# Patient Record
Sex: MALE | Race: WHITE | NOT HISPANIC OR LATINO
[De-identification: names, ages, dates, MRNs, and addresses within clinical notes are randomized per-mention and may not be internally consistent; named-entity substitution may affect disease eponyms.]

---

## 2017-03-09 PROBLEM — Z00.00 ENCOUNTER FOR PREVENTIVE HEALTH EXAMINATION: Status: ACTIVE | Noted: 2017-03-09

## 2017-03-20 ENCOUNTER — APPOINTMENT (OUTPATIENT)
Dept: CARDIOLOGY | Facility: CLINIC | Age: 52
End: 2017-03-20

## 2017-03-23 ENCOUNTER — APPOINTMENT (OUTPATIENT)
Dept: CARDIOLOGY | Facility: CLINIC | Age: 52
End: 2017-03-23

## 2017-04-03 ENCOUNTER — NON-APPOINTMENT (OUTPATIENT)
Age: 52
End: 2017-04-03

## 2017-04-03 ENCOUNTER — APPOINTMENT (OUTPATIENT)
Dept: CARDIOLOGY | Facility: CLINIC | Age: 52
End: 2017-04-03

## 2017-04-03 VITALS
HEART RATE: 74 BPM | HEIGHT: 71 IN | WEIGHT: 204 LBS | SYSTOLIC BLOOD PRESSURE: 138 MMHG | DIASTOLIC BLOOD PRESSURE: 81 MMHG | OXYGEN SATURATION: 97 % | BODY MASS INDEX: 28.56 KG/M2

## 2017-04-03 DIAGNOSIS — R06.02 SHORTNESS OF BREATH: ICD-10-CM

## 2017-04-03 DIAGNOSIS — Z80.1 FAMILY HISTORY OF MALIGNANT NEOPLASM OF TRACHEA, BRONCHUS AND LUNG: ICD-10-CM

## 2017-05-04 ENCOUNTER — APPOINTMENT (OUTPATIENT)
Dept: CARDIOLOGY | Facility: CLINIC | Age: 52
End: 2017-05-04

## 2019-11-11 ENCOUNTER — APPOINTMENT (OUTPATIENT)
Dept: ORTHOPEDIC SURGERY | Facility: CLINIC | Age: 54
End: 2019-11-11
Payer: COMMERCIAL

## 2019-11-11 VITALS
BODY MASS INDEX: 28.56 KG/M2 | SYSTOLIC BLOOD PRESSURE: 124 MMHG | HEART RATE: 73 BPM | HEIGHT: 71 IN | WEIGHT: 204 LBS | DIASTOLIC BLOOD PRESSURE: 80 MMHG

## 2019-11-11 DIAGNOSIS — M48.02 SPINAL STENOSIS, CERVICAL REGION: ICD-10-CM

## 2019-11-11 PROCEDURE — 99204 OFFICE O/P NEW MOD 45 MIN: CPT

## 2019-11-11 RX ORDER — AZITHROMYCIN 250 MG/1
250 TABLET, FILM COATED ORAL
Qty: 6 | Refills: 0 | Status: ACTIVE | COMMUNITY
Start: 2019-10-09

## 2019-11-11 RX ORDER — METHYLPREDNISOLONE 4 MG/1
4 TABLET ORAL
Qty: 21 | Refills: 0 | Status: ACTIVE | COMMUNITY
Start: 2019-09-12

## 2019-11-11 RX ORDER — MONTELUKAST 10 MG/1
10 TABLET, FILM COATED ORAL
Qty: 14 | Refills: 0 | Status: ACTIVE | COMMUNITY
Start: 2019-10-22

## 2019-11-11 RX ORDER — METHYLPREDNISOLONE 4 MG/1
4 TABLET ORAL
Qty: 2 | Refills: 1 | Status: ACTIVE | COMMUNITY
Start: 2019-11-11 | End: 1900-01-01

## 2019-11-11 RX ORDER — AZELASTINE HYDROCHLORIDE 137 UG/1
137 SPRAY, METERED NASAL
Qty: 30 | Refills: 0 | Status: ACTIVE | COMMUNITY
Start: 2019-10-22

## 2020-01-13 ENCOUNTER — APPOINTMENT (OUTPATIENT)
Dept: ORTHOPEDIC SURGERY | Facility: CLINIC | Age: 55
End: 2020-01-13
Payer: COMMERCIAL

## 2020-01-13 VITALS — HEART RATE: 74 BPM | SYSTOLIC BLOOD PRESSURE: 134 MMHG | DIASTOLIC BLOOD PRESSURE: 86 MMHG

## 2020-01-13 DIAGNOSIS — M54.12 RADICULOPATHY, CERVICAL REGION: ICD-10-CM

## 2020-01-13 PROCEDURE — 99214 OFFICE O/P EST MOD 30 MIN: CPT

## 2020-01-22 ENCOUNTER — EMERGENCY (EMERGENCY)
Facility: HOSPITAL | Age: 55
LOS: 0 days | Discharge: HOME | End: 2020-01-22
Attending: EMERGENCY MEDICINE
Payer: COMMERCIAL

## 2020-01-22 VITALS
RESPIRATION RATE: 18 BRPM | TEMPERATURE: 99 F | DIASTOLIC BLOOD PRESSURE: 73 MMHG | OXYGEN SATURATION: 100 % | HEART RATE: 73 BPM | SYSTOLIC BLOOD PRESSURE: 129 MMHG

## 2020-01-22 VITALS — HEIGHT: 71 IN | WEIGHT: 184.97 LBS

## 2020-01-22 DIAGNOSIS — H57.11 OCULAR PAIN, RIGHT EYE: ICD-10-CM

## 2020-01-22 DIAGNOSIS — H05.011 CELLULITIS OF RIGHT ORBIT: ICD-10-CM

## 2020-01-22 DIAGNOSIS — Z88.0 ALLERGY STATUS TO PENICILLIN: ICD-10-CM

## 2020-01-22 LAB
ALBUMIN SERPL ELPH-MCNC: 4.5 G/DL — SIGNIFICANT CHANGE UP (ref 3.3–5)
ALP SERPL-CCNC: 75 U/L — SIGNIFICANT CHANGE UP (ref 40–120)
ALT FLD-CCNC: 30 U/L — SIGNIFICANT CHANGE UP (ref 12–78)
ANION GAP SERPL CALC-SCNC: 5 MMOL/L — SIGNIFICANT CHANGE UP (ref 5–17)
APTT BLD: 35.5 SEC — SIGNIFICANT CHANGE UP (ref 27.5–36.3)
AST SERPL-CCNC: 20 U/L — SIGNIFICANT CHANGE UP (ref 15–37)
BASOPHILS # BLD AUTO: 0.04 K/UL — SIGNIFICANT CHANGE UP (ref 0–0.2)
BASOPHILS NFR BLD AUTO: 0.5 % — SIGNIFICANT CHANGE UP (ref 0–2)
BILIRUB SERPL-MCNC: 1.3 MG/DL — HIGH (ref 0.2–1.2)
BUN SERPL-MCNC: 17 MG/DL — SIGNIFICANT CHANGE UP (ref 7–23)
CALCIUM SERPL-MCNC: 9.7 MG/DL — SIGNIFICANT CHANGE UP (ref 8.5–10.1)
CHLORIDE SERPL-SCNC: 104 MMOL/L — SIGNIFICANT CHANGE UP (ref 96–108)
CO2 SERPL-SCNC: 30 MMOL/L — SIGNIFICANT CHANGE UP (ref 22–31)
CREAT SERPL-MCNC: 1.25 MG/DL — SIGNIFICANT CHANGE UP (ref 0.5–1.3)
EOSINOPHIL # BLD AUTO: 0.21 K/UL — SIGNIFICANT CHANGE UP (ref 0–0.5)
EOSINOPHIL NFR BLD AUTO: 2.5 % — SIGNIFICANT CHANGE UP (ref 0–6)
GLUCOSE SERPL-MCNC: 87 MG/DL — SIGNIFICANT CHANGE UP (ref 70–99)
HCT VFR BLD CALC: 49.3 % — SIGNIFICANT CHANGE UP (ref 39–50)
HGB BLD-MCNC: 17 G/DL — SIGNIFICANT CHANGE UP (ref 13–17)
IMM GRANULOCYTES NFR BLD AUTO: 0.5 % — SIGNIFICANT CHANGE UP (ref 0–1.5)
INR BLD: 1.15 RATIO — SIGNIFICANT CHANGE UP (ref 0.88–1.16)
LYMPHOCYTES # BLD AUTO: 2.16 K/UL — SIGNIFICANT CHANGE UP (ref 1–3.3)
LYMPHOCYTES # BLD AUTO: 25.6 % — SIGNIFICANT CHANGE UP (ref 13–44)
MAGNESIUM SERPL-MCNC: 2.2 MG/DL — SIGNIFICANT CHANGE UP (ref 1.6–2.6)
MCHC RBC-ENTMCNC: 30.1 PG — SIGNIFICANT CHANGE UP (ref 27–34)
MCHC RBC-ENTMCNC: 34.5 GM/DL — SIGNIFICANT CHANGE UP (ref 32–36)
MCV RBC AUTO: 87.4 FL — SIGNIFICANT CHANGE UP (ref 80–100)
MONOCYTES # BLD AUTO: 0.69 K/UL — SIGNIFICANT CHANGE UP (ref 0–0.9)
MONOCYTES NFR BLD AUTO: 8.2 % — SIGNIFICANT CHANGE UP (ref 2–14)
NEUTROPHILS # BLD AUTO: 5.31 K/UL — SIGNIFICANT CHANGE UP (ref 1.8–7.4)
NEUTROPHILS NFR BLD AUTO: 62.7 % — SIGNIFICANT CHANGE UP (ref 43–77)
PLATELET # BLD AUTO: 365 K/UL — SIGNIFICANT CHANGE UP (ref 150–400)
POTASSIUM SERPL-MCNC: 4.1 MMOL/L — SIGNIFICANT CHANGE UP (ref 3.5–5.3)
POTASSIUM SERPL-SCNC: 4.1 MMOL/L — SIGNIFICANT CHANGE UP (ref 3.5–5.3)
PROT SERPL-MCNC: 8.5 GM/DL — HIGH (ref 6–8.3)
PROTHROM AB SERPL-ACNC: 12.8 SEC — SIGNIFICANT CHANGE UP (ref 10–12.9)
RBC # BLD: 5.64 M/UL — SIGNIFICANT CHANGE UP (ref 4.2–5.8)
RBC # FLD: 12 % — SIGNIFICANT CHANGE UP (ref 10.3–14.5)
SODIUM SERPL-SCNC: 139 MMOL/L — SIGNIFICANT CHANGE UP (ref 135–145)
WBC # BLD: 8.45 K/UL — SIGNIFICANT CHANGE UP (ref 3.8–10.5)
WBC # FLD AUTO: 8.45 K/UL — SIGNIFICANT CHANGE UP (ref 3.8–10.5)

## 2020-01-22 PROCEDURE — 85730 THROMBOPLASTIN TIME PARTIAL: CPT

## 2020-01-22 PROCEDURE — 99285 EMERGENCY DEPT VISIT HI MDM: CPT

## 2020-01-22 PROCEDURE — 85610 PROTHROMBIN TIME: CPT

## 2020-01-22 PROCEDURE — 80053 COMPREHEN METABOLIC PANEL: CPT

## 2020-01-22 PROCEDURE — 85025 COMPLETE CBC W/AUTO DIFF WBC: CPT

## 2020-01-22 PROCEDURE — 70481 CT ORBIT/EAR/FOSSA W/DYE: CPT

## 2020-01-22 PROCEDURE — 36415 COLL VENOUS BLD VENIPUNCTURE: CPT

## 2020-01-22 PROCEDURE — 83735 ASSAY OF MAGNESIUM: CPT

## 2020-01-22 PROCEDURE — 99284 EMERGENCY DEPT VISIT MOD MDM: CPT | Mod: 25

## 2020-01-22 PROCEDURE — 70481 CT ORBIT/EAR/FOSSA W/DYE: CPT | Mod: 26

## 2020-01-22 RX ORDER — SODIUM CHLORIDE 9 MG/ML
1000 INJECTION INTRAMUSCULAR; INTRAVENOUS; SUBCUTANEOUS ONCE
Refills: 0 | Status: COMPLETED | OUTPATIENT
Start: 2020-01-22 | End: 2020-01-22

## 2020-01-22 RX ADMIN — SODIUM CHLORIDE 2000 MILLILITER(S): 9 INJECTION INTRAMUSCULAR; INTRAVENOUS; SUBCUTANEOUS at 15:50

## 2020-01-22 NOTE — ED STATDOCS - EYES, MLM
Scleral injection and edema throughout with mild swelling or upper and lower lid of right eye. Left eye normal.

## 2020-01-22 NOTE — ED STATDOCS - PROGRESS NOTE DETAILS
55 y/o male with no significant PMHx presents to the ED c/o right eye pain and swelling. Pt states he was at work yesterday, went to the bathroom and noticed blood in his right eye. Pt sent to ED by Dr. Desai for evaluation to r/o right orbital cellulitis. Allergies: Penicillin. No other complaints at this time.  PE: Scleral injection and edema throughout with mild swelling or upper and lower lid of right eye. Left eye normal.  Plan: CT orbits, labs, speak with renetta Luu PA-C Dr. Roach spoke with ortho Dr. Desai, who does not seem to have privileges here, would like CT orbits, pt will need to be transferred if CT positive  Grisel Luu PA-C labs WNL, CT with Right periorbital soft tissue swelling suggesting cellulitis. No evidence of orbital cellulitis.  When speaking with pt again he let us know [facility] started him on doxy and polytrim drops which he has seen a slight improvement since taking 2 doses.  As there is no orbital cellulitis will d/c home c/w abx, f/u with optho tomorrow w/o fail, also advised warm compresses.  Strict return precautions given including fevers, worsening pain, visual changes or other new or worsening sx, pt verbalized understanding.  Spoke with optho Dr. Desai again would like her office to see pt tmrw, appt was made for pt at 9AM  .  Pt agreeable to d/c and plan of care, all questions answered  Grisel Luu PA-C

## 2020-01-22 NOTE — ED STATDOCS - PATIENT PORTAL LINK FT
You can access the FollowMyHealth Patient Portal offered by [facility] by registering at the following website: [email or web address]. By joining [facility]’s FollowMyHealth portal, you will also be able to view your health information using other applications (apps) compatible with our system.

## 2020-01-22 NOTE — ED STATDOCS - OBJECTIVE STATEMENT
53 y/o male with no significant PMHx presents to the ED c/o right eye pain and swelling. Pt states he was at work yesterday, went to the bathroom and noticed blood in his right eye. Pt sent to ED by Dr. Desai for evaluation to r/o right orbital cellulitis. Allergies: Penicillin. No other complaints at this time.

## 2020-01-22 NOTE — ED ADULT NURSE NOTE - NSIMPLEMENTINTERV_GEN_ALL_ED
Implemented All Universal Safety Interventions:  [place] to call system. Call bell, personal items and telephone within reach. Instruct patient to call for assistance. Room bathroom lighting operational. Non-slip footwear when patient is off stretcher. Physically safe environment: no spills, clutter or unnecessary equipment. Stretcher in lowest position, wheels locked, appropriate side rails in place.

## 2020-01-22 NOTE — ED STATDOCS - ATTENDING CONTRIBUTION TO CARE
I, Nicole Roach MD,  performed the initial face to face bedside interview with this patient regarding history of present illness, review of symptoms and relevant past medical, social and family history.  I completed an independent physical examination.  I was the initial provider who evaluated this patient. I have signed out the follow up of any pending tests (i.e. labs, radiological studies) to the ACP.  I have communicated the patient’s plan of care and disposition with the ACP.  The history, relevant review of systems, past medical and surgical history, medical decision making, and physical examination was documented by the scribe in my presence and I attest to the accuracy of the documentation.

## 2020-01-22 NOTE — ED ADULT NURSE NOTE - OBJECTIVE STATEMENT
Pt to ED c/o right eye pain and swelling. Pt states he was at work yesterday, went to the bathroom and noticed blood in his right eye. Pt sent to ED by Dr. Desai for evaluation to r/o right orbital cellulitis.

## 2020-01-22 NOTE — ED STATDOCS - NSFOLLOWUPINSTRUCTIONS_ED_ALL_ED_FT
Preseptal Cellulitis, Adult    Please go to your appointment with [facility] tomorrow at 9:00 AM      Preseptal cellulitis is an infection of the eyelid and the tissues around the eye (periorbital area). The infection causes painful swelling and redness. This condition may also be called periorbital cellulitis.  In most cases, the condition can be treated with antibiotic medicine at home. It is important to treat preseptal cellulitis right away so that it does not get worse. If it gets worse, it can spread to the eye socket and eye muscles (orbital cellulitis). Orbital cellulitis is a medical emergency.  What are the causes?  Preseptal cellulitis is most commonly caused by bacteria. In rare cases, it can be caused by a virus or fungus. The germs that cause preseptal cellulitis may come from:  A sinus infection that spreads near the eyes. An injury near the eye, such as a scratch, animal bite, or insect bite. A skin rash that becomes infected, such as eczema or poison ivy. An infected pimple on the eyelid (stye).Infection after eyelid surgery or injury.  What increases the risk?  You are more likely to develop this condition if:  You have a weakened disease-fighting system (immune system).You have a medical condition that raises your risk for sinus infections, such as nasal polyps.  What are the signs or symptoms?  Symptoms of this condition usually develop suddenly. Symptoms may include:  Eyelids that are red and swollen and feel unusually hot.  Fever. Difficulty opening the eye.  Headache. Facial pain.  How is this diagnosed?  This condition may be diagnosed based on your symptoms, your medical history, and an eye exam. You may have tests, such as:  Blood tests.CT scan.MRI.  How is this treated?  This condition is treated with antibiotic medicines. These may be given by mouth (orally), through an IV, or as a shot. In rare cases, you may need surgery to drain an infected area.  Follow these instructions at home:  Medicines     If you were prescribed an antibiotic to take at home, take it as told by your health care provider. Do not stop taking the antibiotic even if you start to feel better.  Take over-the-counter and prescription medicines only as told by your health care provider.  Eye Care     Do not use eye drops without first getting approval from your health care provider.Do not touch or rub your eye. If you wear contact lenses, do not wear them until your health care provider approves.Keep the eye area clean and dry.Wash the eye area with a clean washcloth, warm water, and baby shampoo or mild soap.To help relieve discomfort, place a clean washcloth that is wet with warm water over your eye. Leave the washcloth on for a few minutes, then remove it.General instructions     Wash your hands with soap and water often. If soap and water are not available, use hand .Do not use any products that contain nicotine or tobacco, such as cigarettes and e-cigarettes. If you need help quitting, ask your health care provider.Drink enough fluid to keep your urine pale yellow.Ask your health care provider if it is safe for you to drive.Stay up to date on your vaccinations.Keep all follow-up visits as told by your health care provider. This includes any visits with an eye specialist (ophthalmologist) or dentist. This is important.Get help right away if:  You have new symptoms.Your symptoms get worse or do not get better with treatment.You have a fever.Your vision becomes blurry or gets worse in any way.Your eye looks like it is sticking out or bulging out (proptosis).You have trouble moving your eyes.You have a severe headache.You have neck stiffness or severe neck pain.Summary  Preseptal cellulitis is an infection of the eyelid and the tissues around the eye.Symptoms of preseptal cellulitis usually develop suddenly and include red and swollen eyelids, fever, difficulty opening the eye, headache, and facial pain.This condition is treated with antibiotic medicines. Do not stop taking the antibiotic even if you start to feel better.This information is not intended to replace advice given to you by your health care provider. Make sure you discuss any questions you have with your health care provider.

## 2020-01-22 NOTE — ED STATDOCS - CLINICAL SUMMARY MEDICAL DECISION MAKING FREE TEXT BOX
labs, CT orbits, optho,   CT with Right periorbital soft tissue swelling suggesting cellulitis. No evidence of orbital cellulitis, labs WNL, will d/c home with optho appt leann, c/w doxy

## 2020-01-24 ENCOUNTER — INPATIENT (INPATIENT)
Facility: HOSPITAL | Age: 55
LOS: 1 days | Discharge: HOME | DRG: 603 | End: 2020-01-26
Attending: HOSPITALIST | Admitting: HOSPITALIST
Payer: COMMERCIAL

## 2020-01-24 VITALS
HEIGHT: 71 IN | TEMPERATURE: 98 F | RESPIRATION RATE: 16 BRPM | WEIGHT: 184.97 LBS | DIASTOLIC BLOOD PRESSURE: 78 MMHG | SYSTOLIC BLOOD PRESSURE: 131 MMHG | HEART RATE: 77 BPM | OXYGEN SATURATION: 97 %

## 2020-01-24 DIAGNOSIS — H05.011 CELLULITIS OF RIGHT ORBIT: ICD-10-CM

## 2020-01-24 DIAGNOSIS — Z90.49 ACQUIRED ABSENCE OF OTHER SPECIFIED PARTS OF DIGESTIVE TRACT: Chronic | ICD-10-CM

## 2020-01-24 LAB
ALBUMIN SERPL ELPH-MCNC: 5 G/DL — SIGNIFICANT CHANGE UP (ref 3.3–5)
ALP SERPL-CCNC: 67 U/L — SIGNIFICANT CHANGE UP (ref 40–120)
ALT FLD-CCNC: 21 U/L — SIGNIFICANT CHANGE UP (ref 10–45)
ANION GAP SERPL CALC-SCNC: 13 MMOL/L — SIGNIFICANT CHANGE UP (ref 5–17)
APTT BLD: 33.9 SEC — SIGNIFICANT CHANGE UP (ref 27.5–36.3)
AST SERPL-CCNC: 21 U/L — SIGNIFICANT CHANGE UP (ref 10–40)
BASOPHILS # BLD AUTO: 0.06 K/UL — SIGNIFICANT CHANGE UP (ref 0–0.2)
BASOPHILS NFR BLD AUTO: 0.9 % — SIGNIFICANT CHANGE UP (ref 0–2)
BILIRUB SERPL-MCNC: 1.1 MG/DL — SIGNIFICANT CHANGE UP (ref 0.2–1.2)
BUN SERPL-MCNC: 18 MG/DL — SIGNIFICANT CHANGE UP (ref 7–23)
CALCIUM SERPL-MCNC: 10.4 MG/DL — SIGNIFICANT CHANGE UP (ref 8.4–10.5)
CHLORIDE SERPL-SCNC: 98 MMOL/L — SIGNIFICANT CHANGE UP (ref 96–108)
CO2 SERPL-SCNC: 28 MMOL/L — SIGNIFICANT CHANGE UP (ref 22–31)
CREAT SERPL-MCNC: 1.27 MG/DL — SIGNIFICANT CHANGE UP (ref 0.5–1.3)
EOSINOPHIL # BLD AUTO: 0.2 K/UL — SIGNIFICANT CHANGE UP (ref 0–0.5)
EOSINOPHIL NFR BLD AUTO: 2.8 % — SIGNIFICANT CHANGE UP (ref 0–6)
GLUCOSE SERPL-MCNC: 90 MG/DL — SIGNIFICANT CHANGE UP (ref 70–99)
HCT VFR BLD CALC: 48.5 % — SIGNIFICANT CHANGE UP (ref 39–50)
HGB BLD-MCNC: 16.6 G/DL — SIGNIFICANT CHANGE UP (ref 13–17)
IMM GRANULOCYTES NFR BLD AUTO: 0.9 % — SIGNIFICANT CHANGE UP (ref 0–1.5)
INR BLD: 1.1 RATIO — SIGNIFICANT CHANGE UP (ref 0.88–1.16)
LYMPHOCYTES # BLD AUTO: 2.15 K/UL — SIGNIFICANT CHANGE UP (ref 1–3.3)
LYMPHOCYTES # BLD AUTO: 30.5 % — SIGNIFICANT CHANGE UP (ref 13–44)
MCHC RBC-ENTMCNC: 29.7 PG — SIGNIFICANT CHANGE UP (ref 27–34)
MCHC RBC-ENTMCNC: 34.2 GM/DL — SIGNIFICANT CHANGE UP (ref 32–36)
MCV RBC AUTO: 86.9 FL — SIGNIFICANT CHANGE UP (ref 80–100)
MONOCYTES # BLD AUTO: 0.71 K/UL — SIGNIFICANT CHANGE UP (ref 0–0.9)
MONOCYTES NFR BLD AUTO: 10.1 % — SIGNIFICANT CHANGE UP (ref 2–14)
NEUTROPHILS # BLD AUTO: 3.86 K/UL — SIGNIFICANT CHANGE UP (ref 1.8–7.4)
NEUTROPHILS NFR BLD AUTO: 54.8 % — SIGNIFICANT CHANGE UP (ref 43–77)
NRBC # BLD: 0 /100 WBCS — SIGNIFICANT CHANGE UP (ref 0–0)
PLATELET # BLD AUTO: 353 K/UL — SIGNIFICANT CHANGE UP (ref 150–400)
POTASSIUM SERPL-MCNC: 4 MMOL/L — SIGNIFICANT CHANGE UP (ref 3.5–5.3)
POTASSIUM SERPL-SCNC: 4 MMOL/L — SIGNIFICANT CHANGE UP (ref 3.5–5.3)
PROT SERPL-MCNC: 8.1 G/DL — SIGNIFICANT CHANGE UP (ref 6–8.3)
PROTHROM AB SERPL-ACNC: 12.7 SEC — SIGNIFICANT CHANGE UP (ref 10–12.9)
RBC # BLD: 5.58 M/UL — SIGNIFICANT CHANGE UP (ref 4.2–5.8)
RBC # FLD: 11.8 % — SIGNIFICANT CHANGE UP (ref 10.3–14.5)
SODIUM SERPL-SCNC: 139 MMOL/L — SIGNIFICANT CHANGE UP (ref 135–145)
WBC # BLD: 7.04 K/UL — SIGNIFICANT CHANGE UP (ref 3.8–10.5)
WBC # FLD AUTO: 7.04 K/UL — SIGNIFICANT CHANGE UP (ref 3.8–10.5)

## 2020-01-24 PROCEDURE — 99285 EMERGENCY DEPT VISIT HI MDM: CPT

## 2020-01-24 PROCEDURE — 70481 CT ORBIT/EAR/FOSSA W/DYE: CPT | Mod: 26

## 2020-01-24 PROCEDURE — 99223 1ST HOSP IP/OBS HIGH 75: CPT

## 2020-01-24 RX ORDER — SODIUM CHLORIDE 9 MG/ML
1000 INJECTION INTRAMUSCULAR; INTRAVENOUS; SUBCUTANEOUS ONCE
Refills: 0 | Status: COMPLETED | OUTPATIENT
Start: 2020-01-24 | End: 2020-01-24

## 2020-01-24 RX ORDER — VANCOMYCIN HCL 1 G
1000 VIAL (EA) INTRAVENOUS ONCE
Refills: 0 | Status: COMPLETED | OUTPATIENT
Start: 2020-01-24 | End: 2020-01-24

## 2020-01-24 RX ORDER — CEFTRIAXONE 500 MG/1
1000 INJECTION, POWDER, FOR SOLUTION INTRAMUSCULAR; INTRAVENOUS EVERY 24 HOURS
Refills: 0 | Status: DISCONTINUED | OUTPATIENT
Start: 2020-01-24 | End: 2020-01-24

## 2020-01-24 RX ORDER — CIPROFLOXACIN LACTATE 400MG/40ML
400 VIAL (ML) INTRAVENOUS ONCE
Refills: 0 | Status: DISCONTINUED | OUTPATIENT
Start: 2020-01-24 | End: 2020-01-24

## 2020-01-24 RX ADMIN — SODIUM CHLORIDE 1000 MILLILITER(S): 9 INJECTION INTRAMUSCULAR; INTRAVENOUS; SUBCUTANEOUS at 20:54

## 2020-01-24 RX ADMIN — Medication 250 MILLIGRAM(S): at 20:55

## 2020-01-24 NOTE — H&P ADULT - PROBLEM SELECTOR PLAN 3
Transitions of Care Status:  1.  Name of PCP: Larissa Green  2.  PCP Contacted on Admission: [ ] Y    [ x] N    3.  PCP contacted at Discharge: [ ] Y    [ ] N    [ ] N/A  4.  Post-Discharge Appointment Date and Location:  5.  Summary of Handoff given to PCP:

## 2020-01-24 NOTE — ED PROVIDER NOTE - EYES [+], MLM
R eye swelling, R eye pain exacerbated with ocular movement, bleeding from R eye, R facial swelling R eye swelling, R eye pain exacerbated with ocular movement, bleeding from R eye, R facial swelling, mild blurry R eye vision/VISUAL CHANGES

## 2020-01-24 NOTE — H&P ADULT - NSHPLABSRESULTS_GEN_ALL_CORE
Labs personally reviewed : cbc, coags, cmp all unrevealing.   Imaging personally reviewed Repeat CT orbits showed +R periorbital soft tissue swelling suggesting cellulitis possibly a bit worsened but without evidence of orbital cellulitis. +mucosal thickening of b/l frontal/ethmoid/maxillary sinuses.  NO ekg in chart.

## 2020-01-24 NOTE — H&P ADULT - HISTORY OF PRESENT ILLNESS
54 M no known PMH, p/w progressive R eye pain.    VS: 98.2, 77, 131/78, 16, 97% RA.  Labs: cbc, coags, cmp all unrevealing. Repeat CT orbits showed +R periorbital soft tissue swelling suggesting cellulitis without evidence of orbital cellulitis. +mucosal thickening of b/l frontal/ethmoid/maxillary sinuses. In ER pt received IV vanco/ceftriaxone and IVF prior to medicine team involvement. 54 M hx of EBV infection, p/w progressive R eye pain. Sx started 1/21/20 while at work.  Noticed redness like blood in eye. +pain, worsened with movement, +eye swelling and tearing. Pt doesn't recall foreign body or bite. Never had previous episode like this.  Pt notes that about 1-2 wks ago felt a flare of his EBV with bronchitis like sx, thinks current eye sx may be related.  Also notes +sick contacts (wife/children all sick with flu like illness); pt denies other URI sx or myalgias. Denies cp, sob, f/c, +mild intermittent nausea no v/d, denies dysuria. Pt was seen at urgent care center 1/21/20, started on oral doxycycline. Sx not really changing, so he was referred to [place] ER 1/22/20 for CT scan of orbits that showed periorbital cellulitis without evidence of orbital cellulitis.  Was sent home, and then followed with Dr. Brendan Gagnon (oculoplastic surgery) today and was sent in for evaluation of orbital cellulitis as his exam appears to have worsened despite oral antibiotics.     VS: 98.2, 77, 131/78, 16, 97% RA.  Labs: cbc, coags, cmp all unrevealing. Repeat CT orbits showed +R periorbital soft tissue swelling suggesting cellulitis without evidence of orbital cellulitis. +mucosal thickening of b/l frontal/ethmoid/maxillary sinuses. In ER pt received IV vanco/ceftriaxone and IVF prior to medicine team involvement. 54 M hx of EBV infection, p/w progressive R eye pain. Sx started 1/21/20 while at work.  Noticed redness like blood in eye. +pain, worsened with movement, +eye swelling and tearing. Pt doesn't recall foreign body or bite. Never had previous episode like this.  Pt notes that about 1-2 wks ago felt a flare of his EBV with bronchitis like sx, thinks current eye sx may be related.  Also notes +sick contacts (wife/children all sick with flu like illness); pt denies other URI sx or myalgias. Denies cp, sob, f/c, +mild intermittent nausea no v/d, denies dysuria. Pt was seen at urgent care center 1/21/20, started on oral doxycycline. Sx not really changing, so he was referred to [place] ER 1/22/20 for CT scan of orbits that showed periorbital cellulitis without evidence of orbital cellulitis.  Was sent home, and then followed with Dr. Brendan Gagnon (oculoplastic surgery) today and was sent in for evaluation of orbital cellulitis as his exam appears to have worsened despite oral antibiotics.     VS: 98.2, 77, 131/78, 16, 97% RA.  Labs: cbc, coags, cmp all unrevealing. Repeat CT orbits showed +R periorbital soft tissue swelling suggesting cellulitis possibly a bit worsened but without evidence of orbital cellulitis. +mucosal thickening of b/l frontal/ethmoid/maxillary sinuses. In ER pt received IV vanco  and IVF prior to medicine team involvement.

## 2020-01-24 NOTE — H&P ADULT - PROBLEM SELECTOR PLAN 1
-erythema, chemosis, tearing, pain of R eye, unclear precipitant. Interval repeat CT orbits confirms slightly worsened preseptal cellulitis WITHOUT evidence of orbital cellulitis  -optho eval appreciated, c/w IV abx, erythromycin ointment over R eyelid margin QHS.   -Pt offered IV vanco and IV ceftriaxone.  Pt with noted PCN allergy as child (reported as possible hives/rash); I explained at length the overall relatively low risk of cross reactivity with cephalosporins especially with no documented anaphylaxis to PCN in past.  Patient still hesitant, wants to avoid cephalosporins unless absolutely necessary.   -Will start IV vancomycin, IV levaquin, and add IV flagyl for anaerobic coverage given concurrent sinusitis and suspected oral/sinus translocation as possible source. D/w ID fellow overnight, in agreement, pt to be seen by their service in am.   -consider addition of acyclovir for hx of EBV though efficacy of acyclovir for latent, non-actively replicating EBV infections is not supported. Defer to ID for addition of antivirals to treatment regimen.   -f/u bcx  -serial eye exams, optho f/u

## 2020-01-24 NOTE — ED ADULT NURSE NOTE - OBJECTIVE STATEMENT
55y/o M coming to the ED c/o of R eye swelling and pain.  Pt states that tuesday he was at work when he began to have an uncomfortable itching in his eye and when he looked in the mirror he noticed a pool of blood in his eye. Pt initally saw an opthamologist who then sent him to [place] ED for further evaluation where the CT showed right periobital cellulitis. Pt then followed up with opthalmologist yesterday and was sent home with ABX. Pt then saw surgeon today who then sent him here for further evaluation for progression d/t the surrounding area of his eye is beginning to swell. Pt denies any trauma to his eye. Pt states denies any CP/Fever/Chills/N/V/D. Pt states that he has been congested over the past two weeks but denies any cough. On exam, his L eye appears swollen & red w/o discharge, swelling and redness noted to the R upper cheek underneath the eye. Pt denies any change in vision. PERRL. IV placed. Labs collected and sent.

## 2020-01-24 NOTE — CONSULT NOTE ADULT - SUBJECTIVE AND OBJECTIVE BOX
[facility] Ophthalmology Consult Note    HPI:  The pt is a 53 yo M who presents, sent in by private ophthalmologist Dr Brendan Gagnon of [facility] ([phone]), for concern of orbital cellulitis of the right eye. The pt noticed some redness of the bottom of the bulbar conjunctiva of the right eye on Tuesday. The pt visited a [facility] MD, who examined the pt and was given a prescription of PO Doxycycline 100 mg BID. The pt felt persistent pain but felt some improvement of the redness and chemosis of the right eye through Wednesday, and sought further care. He was examined by Optometrist Dr Sheela Desai of [facility] on Wednesday who advised the pt to continue Oral antibiotics and to go to [facility] for a CT That same day. The pt did so, and was seen in the ED and a CT Orbits was performed which demonstrated periorbital soft tissue cellulitis, no evidence of orbital cellulitis. The pt was recommended continuation of PO ABX and was discharged from the ED. On Thursday, the pt was seen by Ophthalmologist Dr Prado of [facility], who recommended continuation of oral ABX, and recommended daily follow up. Dr Gagnon examined the pt today who reported  and was concerned for worsening  chemosis of the right eye, and was concerned orbital cellulitis OD and sent  the pt to the ED.     The pt denies fever, chills,   The pt does has report malaise, pain with EOM, tearing of the right eye, pt has yellow/white discharge from the right eye, with crusting of the right eyelid upon waking.     PMH: appendectomy   Meds: None  POcHx (including surgeries/lasers/trauma):  Radial keratotomy OU (1998)   Drops: None  FamHx: None  Social Hx: None  Allergies: Penicillin     ROS:  General (neg), Vision (per HPI), Head and Neck (as above), Pulm (recent nasal congestion), CV (neg), GI (neg),  (neg), Musculoskeletal (neg), Skin/Integ (neg), Neuro (neg), Endocrine (neg), Heme (neg), All/Immuno (neg)    Mood and Affect Appropriate ( x ),  Oriented to Time, Place, and Person x 3 ( x )    Ophthalmology Exam    Visual acuity (sc): OD: 20/40 pinhole improves to 20/20-2 , OS: 20/30 pinhole improves to 20/20-2   Pupils: PERRL OU, no APD  Ttono: 18 OD, 16 OS  Extraocular movements (EOMs): Full OU, no pain, no diplopia  Confrontational Visual Field (CVF):  Full OU  Color Plates: 12/12 OU    Slit Lamp Exam (SLE)  External:  OD: tender to palpation over the periorbit, upper and lower eyelids, and right maxilla, which appears edematous Flat OS, no resistance to retropulsion OU   Lids/Lashes/Lacrimal Ducts: Flat OU    Sclera/Conjunctiva:  OD: +3 chemosis of the bulbar conjunctiva, +2 injection diffuse of the conjunctiva, W+Q OS. OD: due to chemosis of the bulbar conjunctiva, the lid cannot completely close, leaving protruding shelf of bulbar conjunctiva,   Cornea: Cl OU, has circumferential keratotomy incisions OU   Anterior Chamber: D+Q OU   Iris:  Flat OU  Lens:  Cl OU    Fundus Exam: dilated with 1% tropicamide and 2.5% phenylephrine  Approval obtained from primary team for dilation  Patient aware that pupils can remained dilated for at least 4-6 hours  Exam performed with 20D lens    Vitreous: wnl OU  Disc, cup/disc: sharp and pink, 0.3 OU  Macula:  wnl OU  Vessels:  wnl OU  Periphery: wnl OU      Assessment and Plan:   53 yo M dx with preseptal cellulitis this week, presents with progression of right eye injection and chemosis on PO Doxycycline, now with pain with EOM OD, sent in by private ophthalmologist Dr Brendan Gagnon of [facility] ([phone]), for concern of orbital cellulitis of the right eye.     - may have been the result of the spread of a recent sinus infection, given recent hx of cough/nasal congestion   - given progression, recommend CT Orbits with and without contrast if possible,   - CBC with diff, and blood Cx   - given exacerbation of inflammation on Oral antibiotics, reasonable to admit pt for broad spectrum IV antibiotics as per primary team, IV Ceftriaxone and Vancomycin is a reasonable regimen  - recommend generous Erythromycin ointment QHS over the right eyelid margin and over the protruding injected conjunctiva to prevent breakdown overnight.   - will follow closely [facility] Ophthalmology Consult Note    HPI:  The pt is a 53 yo M who presents, sent in by private ophthalmologist Dr Brendan Gagnon of [facility] ([phone]), for concern of orbital cellulitis of the right eye. The pt noticed some redness of the bottom of the bulbar conjunctiva of the right eye on Tuesday. The pt visited a [facility] MD, who examined the pt and was given a prescription of PO Doxycycline 100 mg BID. The pt felt persistent pain but felt some improvement of the redness and chemosis of the right eye through Wednesday, and sought further care. He was examined by Optometrist Dr Sheela Desai of [facility] on Wednesday who advised the pt to continue Oral antibiotics and to go to [facility] for a CT That same day. The pt did so, and was seen in the ED and a CT Orbits was performed which demonstrated periorbital soft tissue cellulitis, no evidence of orbital cellulitis. The pt was recommended continuation of PO ABX and was discharged from the ED. On Thursday, the pt was seen by Ophthalmologist Dr Prado of [facility], who recommended continuation of oral ABX, and recommended daily follow up. Dr Gagnon examined the pt today who reported  and was concerned for worsening  chemosis of the right eye, and was concerned orbital cellulitis OD and sent  the pt to the ED.     The pt denies fever, chills,   The pt does has report malaise, pain with EOM, tearing of the right eye, pt has yellow/white discharge from the right eye, with crusting of the right eyelid upon waking.     PMH: appendectomy   Meds: None  POcHx (including surgeries/lasers/trauma):  Radial keratotomy OU (1998)   Drops: None  FamHx: None  Social Hx: None  Allergies: Penicillin     ROS:  General (neg), Vision (per HPI), Head and Neck (as above), Pulm (recent nasal congestion), CV (neg), GI (neg),  (neg), Musculoskeletal (neg), Skin/Integ (neg), Neuro (neg), Endocrine (neg), Heme (neg), All/Immuno (neg)    Mood and Affect Appropriate ( x ),  Oriented to Time, Place, and Person x 3 ( x )    Ophthalmology Exam    Visual acuity (sc): OD: 20/40 pinhole improves to 20/20-2 , OS: 20/30 pinhole improves to 20/20-2   Pupils: PERRL OU, no APD  Ttono: 18 OD, 16 OS  Extraocular movements (EOMs): Full OU, no pain, no diplopia  Confrontational Visual Field (CVF):  Full OU  Color Plates: 12/12 OU    Slit Lamp Exam (SLE)  External:  OD: tender to palpation over the periorbit, upper and lower eyelids, and right maxilla, which appears edematous Flat OS, no resistance to retropulsion OU   Lids/Lashes/Lacrimal Ducts: Flat OU    Sclera/Conjunctiva:  OD: +3 chemosis of the bulbar conjunctiva, +2 injection diffuse of the conjunctiva, W+Q OS. OD: due to chemosis of the bulbar conjunctiva, the lid cannot completely close, leaving protruding shelf of bulbar conjunctiva,   Cornea: Cl OU, has circumferential keratotomy incisions OU   Anterior Chamber: D+Q OU   Iris:  Flat OU  Lens:  Cl OU    Fundus Exam: dilated with 1% tropicamide and 2.5% phenylephrine  Approval obtained from primary team for dilation  Patient aware that pupils can remained dilated for at least 4-6 hours  Exam performed with 20D lens    Vitreous: wnl OU  Disc, cup/disc: sharp and pink, 0.3 OU  Macula:  wnl OU  Vessels:  wnl OU  Periphery: wnl OU      Assessment and Plan:   53 yo M dx with preseptal cellulitis this week, presents with progression of right eye injection and chemosis on PO Doxycycline, now with pain with EOM OD, sent in by private ophthalmologist Dr Brendan Gagnon of [facility] ([phone]), for concern of orbital cellulitis of the right eye.     - may have been the result of the spread of a recent sinus infection, given recent hx of cough/nasal congestion. The chemosis and injection of the bulbar conjunctiva with less pronounced edema of the lids may be an atypical preseptal/orbital process,   - given progression, recommend CT Orbits with and without contrast if possible, if imaging negative, then this raises the possibility of a bulbar process such as episcleritis vs severe conjunctivitis  - CBC with diff, and blood Cx   - given exacerbation of inflammation on Oral antibiotics, reasonable to admit pt for broad spectrum IV antibiotics as per primary team, IV Ceftriaxone and Vancomycin is a reasonable regimen  - recommend generous Erythromycin ointment QHS over the right eyelid margin and over the protruding injected conjunctiva to prevent breakdown overnight.   - will follow closely

## 2020-01-24 NOTE — ED PROVIDER NOTE - PROGRESS NOTE DETAILS
DH: Ophtho at bedside. Will take patient to exam room for further evaluation. DH: Opho recommending repeat CT orbit w/ IV contrast, start patient on Vanco and Rocephin. Will change Rocephin to Cipro since patient PCN allergic. DH: D/w hospitalist for admission, all questions answered. Patient admitted to medicine. CT scan results still pending. Discussed w/ patient and all questions answered. Patient wife and children at bedside.

## 2020-01-24 NOTE — ED PROVIDER NOTE - PHYSICAL EXAMINATION
Gen: WNWD NAD  HEENT: NCAT, PERRL, R eye with conjunctival injection and swelling, decreased medial and lateral eye movements, superior and inferior eye movements intact.  Neck: supple  CV: RRR, no murmur  Lung: CTA BL  Neuro: CN grossly intact, sensation intact, motor 5/5 throughout GENERAL: A&Ox3, non-toxic appearing, no acute distress  HEENT: NCAT, oral mucosa moist, R conjunctival injection and swelling w/ decreased medial and lateral eye movements, R eye tearing w/o purulent discharge, superior and inferior eye movements intact, mild erythema and swelling beneath R eye, no proptosis, no ptosis  RESP: CTAB, no respiratory distress, no wheezes/rhonchi/rales, speaking in full sentences  CV: RRR, no murmurs/rubs/gallops  ABDOMEN: soft, non-tender, non-distended, no guarding  MSK: no visible deformities  NEURO: no focal sensory or motor deficits, BLACK, steady gait, PERRL  SKIN: warm, normal color, well perfused, no rash  PSYCH: normal affect    -Jesse Courtney, PGY-1

## 2020-01-24 NOTE — ED PROVIDER NOTE - OBJECTIVE STATEMENT
54y M with PMHx of EBV p/w R eye swelling with pain exacerbated with ocular movement, bleeding from R eye, and R facial swelling since Tuesday, 1/21/2020. Sx were sudden onset and started when pt was at work.  Pt initially went to [facility], was referred to [facility] and saw Dr. Sheela Desai, who ordered a CT scan that confirmed R periorbital cellulitis. Pt had a visual acuity test done, told it was WNL. He was told to return to [facility]. Pt F/U with Ophthalmologist on 1/23, who prescribed Doxycycline eyedrops which pt initially used with improvement of sx. He saw oculoplastic surgeon, Dr. Brendan Gagnon, today who advised that pt visit to ER due to his worsening condition upon examination. Denies F/C, sore throat, nasal congestion, facial pressure, or visual changes. Reports allergy to PCN. Denies alcohol or tobacco use. 54y M with PMHx of EBV p/w R eye swelling with pain exacerbated with ocular movement, bleeding from R eye, and R facial swelling since Tuesday, 1/21/2020. Sx were sudden onset and started when pt was at work.  Pt initially went to [facility], was referred to [facility] and saw Dr. Sheela Desai, who ordered a CT scan that confirmed R periorbital cellulitis. Pt had a visual acuity test done, told it was WNL. He was told to return to [facility]. Pt F/U with Ophthalmologist on 1/23, who prescribed Doxycycline eyedrops. He saw oculoplastic surgeon, Dr. Brendan Gagnon, today who advised that pt visit to ER due to his worsening condition upon examination. Denies F/C, sore throat, nasal congestion, facial pressure, or visual changes. Reports allergy to PCN. Denies alcohol or tobacco use. 54y M with PMHx of EBV p/w R eye swelling with pain exacerbated with ocular movement, bleeding from R eye, and R facial swelling since Tuesday, 1/21/2020. Sx were sudden onset and started when pt was at work.  Pt initially went to [facility], was referred to [facility] w/ CT scan that confirmed R periorbital cellulitis. Pt had a visual acuity test done, told it was WNL. He was told to return to [facility]. Pt F/U with Ophthalmologist on 1/23, who prescribed Doxycycline and Polytrip eyedrops. He saw oculoplastic surgeon, Dr. Brendan Gagnon, today who advised that pt visit to ER due to his worsening condition upon examination. Endorses recent URI 1-2 weeks ago. Currently denies F/C, sore throat, nasal congestion, facial pressure, or visual changes. Reports allergy to PCN. Denies alcohol or tobacco use.

## 2020-01-24 NOTE — ED PROVIDER NOTE - ATTENDING CONTRIBUTION TO CARE
Dr Mcneal Note: 55 yo M  p/w 3 day hx worsening R eye redness, swelling, and pain. Seen by [facility] on 1/21 and sent to [facility] w/ CT showing periorbital cellulitis, started on Doxy and Polytrim, [facility] to ED for IV abx .  Gen: no acute distress non toxic alert and coherent, no cyanosis   HEENT: atraumatic, ++significant conjunctiva injection, right lower eye lid edema,   EOMI- pain with EOM    Neck: no midline tenderness, supple  Lungs: Air entry good, clear to auscultation and percussion   CVS: reg HR S1/S2 no murmur no gallop   Neuro: AA and Ox3, CNII-XII grossly intact   A/P--   Labs, IVF, IV abx CT orbits w contrast, optho, admission

## 2020-01-24 NOTE — ED PROVIDER NOTE - CLINICAL SUMMARY MEDICAL DECISION MAKING FREE TEXT BOX
Jesse Courtney, PGY1: 54 year old male p/w 3 day hx worsening R eye redness, swelling, and pain. Seen by [facility] on 1/21 and sent to [facility] w/ CT showing periorbital cellulitis, started on Doxy and Polytrip. Patient seen again at [facility] today and referred for pain and swelling worsening, now including R inferior eye, concern for orbital cellulitis. Patient w/ decreased and painful medial and lateral eye movements, erythema and tearing of R eye, inferior eye edema and erythema, mildly blurry R eye vision, PERRL. Plan for ophtho cs, labs, abx, IVF, CT orbit w/ IV contrast, likely admission.

## 2020-01-24 NOTE — H&P ADULT - NSHPSOCIALHISTORY_GEN_ALL_CORE
Social History:    Marital Status:  (  x )    (   ) Single    (   )    (  )   Occupation:   Lives with: (  ) alone  (  ) children   (  x) spouse   (  ) parents  (  ) other    Substance Use (street drugs): (  x) never used  (  ) other:  Tobacco Usage:  (  x ) never smoked   (   ) former smoker   (   ) current smoker  (     ) pack year  (        ) last cigarette date  Alcohol Usage: denies

## 2020-01-24 NOTE — H&P ADULT - REASON FOR ADMISSION
orbital cellulitis refractory to oral antibiotics preseptal cellulitis refractory to oral antibiotics

## 2020-01-24 NOTE — ED ADULT NURSE NOTE - NSIMPLEMENTINTERV_GEN_ALL_ED
Implemented All Universal Safety Interventions:  [place] to call system. Call bell, personal items and telephone within reach. Instruct patient to call for assistance. Room bathroom lighting operational. Non-slip footwear when patient is off stretcher. Physically safe environment: no spills, clutter or unnecessary equipment. Stretcher in lowest position, wheels locked, appropriate side rails in place.

## 2020-01-24 NOTE — H&P ADULT - NSHPREVIEWOFSYSTEMS_GEN_ALL_CORE
REVIEW OF SYSTEMS:  CONSTITUTIONAL: No weakness. No fevers. No chills. No weight loss. Good appetite.  EYES: No visual changes. No eye pain.  ENT: No hearing difficulty. No vertigo. No dysphagia. No Sinusitis/rhinorrhea.  NECK: No pain. No stiffness/rigidity.  CARDIAC: No chest pain. No palpitations.  RESPIRATORY: No cough. No SOB. No hemoptysis.  GASTROINTESTINAL: No abdominal pain. No nausea. No vomiting. No hematemesis. No diarrhea. No constipation. No melena. No hematochezia.  GENITOURINARY: No dysuria. No frequency. No hesitancy. No hematuria.  NEUROLOGICAL: No numbness. No focal weakness. No incontinence. No headache.  BACK: No back pain.  EXTREMITIES: No lower extremity edema. Full ROM.  SKIN: No rashes. No itching. No other lesions.  PSYCHIATRIC: No depression. No anxiety. No SI/HI.  ALLERGIC: No lip swelling. No hives.  All other review of systems is negative unless indicated above.  [  ] Unable to assess ROS because REVIEW OF SYSTEMS:  CONSTITUTIONAL: No weakness. No fevers. No chills. No weight loss. Good appetite.  EYES: +blurriness at times in R eye, otherwise no visual changes. + R eye pain.  ENT: No hearing difficulty. No vertigo. No dysphagia. +Sinusitis recently.  NECK: No pain. No stiffness/rigidity.  CARDIAC: No chest pain. No palpitations.  RESPIRATORY: No cough. No SOB. No hemoptysis.  GASTROINTESTINAL: No abdominal pain. No nausea. No vomiting. No hematemesis. No diarrhea. No constipation. No melena. No hematochezia.  GENITOURINARY: No dysuria. No frequency. No hesitancy. No hematuria.  NEUROLOGICAL: No numbness. No focal weakness. No incontinence. No headache.  BACK: No back pain.  EXTREMITIES: No lower extremity edema. Full ROM.  SKIN: No rashes. No itching. No other lesions.  PSYCHIATRIC: No depression. No anxiety. No SI/HI.  ALLERGIC: No lip swelling. No hives.  All other review of systems is negative unless indicated above.

## 2020-01-25 DIAGNOSIS — Z02.9 ENCOUNTER FOR ADMINISTRATIVE EXAMINATIONS, UNSPECIFIED: ICD-10-CM

## 2020-01-25 DIAGNOSIS — L03.213 PERIORBITAL CELLULITIS: ICD-10-CM

## 2020-01-25 DIAGNOSIS — Z29.9 ENCOUNTER FOR PROPHYLACTIC MEASURES, UNSPECIFIED: ICD-10-CM

## 2020-01-25 LAB
ALBUMIN SERPL ELPH-MCNC: 4.2 G/DL — SIGNIFICANT CHANGE UP (ref 3.3–5)
ALP SERPL-CCNC: 57 U/L — SIGNIFICANT CHANGE UP (ref 40–120)
ALT FLD-CCNC: 18 U/L — SIGNIFICANT CHANGE UP (ref 10–45)
ANION GAP SERPL CALC-SCNC: 12 MMOL/L — SIGNIFICANT CHANGE UP (ref 5–17)
AST SERPL-CCNC: 19 U/L — SIGNIFICANT CHANGE UP (ref 10–40)
BASOPHILS # BLD AUTO: 0.03 K/UL — SIGNIFICANT CHANGE UP (ref 0–0.2)
BASOPHILS NFR BLD AUTO: 0.6 % — SIGNIFICANT CHANGE UP (ref 0–2)
BILIRUB SERPL-MCNC: 1.2 MG/DL — SIGNIFICANT CHANGE UP (ref 0.2–1.2)
BUN SERPL-MCNC: 17 MG/DL — SIGNIFICANT CHANGE UP (ref 7–23)
CALCIUM SERPL-MCNC: 9.6 MG/DL — SIGNIFICANT CHANGE UP (ref 8.4–10.5)
CHLORIDE SERPL-SCNC: 99 MMOL/L — SIGNIFICANT CHANGE UP (ref 96–108)
CO2 SERPL-SCNC: 25 MMOL/L — SIGNIFICANT CHANGE UP (ref 22–31)
CREAT SERPL-MCNC: 1.22 MG/DL — SIGNIFICANT CHANGE UP (ref 0.5–1.3)
EOSINOPHIL # BLD AUTO: 0.22 K/UL — SIGNIFICANT CHANGE UP (ref 0–0.5)
EOSINOPHIL NFR BLD AUTO: 4.2 % — SIGNIFICANT CHANGE UP (ref 0–6)
GLUCOSE SERPL-MCNC: 95 MG/DL — SIGNIFICANT CHANGE UP (ref 70–99)
HCT VFR BLD CALC: 47 % — SIGNIFICANT CHANGE UP (ref 39–50)
HGB BLD-MCNC: 15.7 G/DL — SIGNIFICANT CHANGE UP (ref 13–17)
IMM GRANULOCYTES NFR BLD AUTO: 0.8 % — SIGNIFICANT CHANGE UP (ref 0–1.5)
LYMPHOCYTES # BLD AUTO: 1.56 K/UL — SIGNIFICANT CHANGE UP (ref 1–3.3)
LYMPHOCYTES # BLD AUTO: 29.7 % — SIGNIFICANT CHANGE UP (ref 13–44)
MAGNESIUM SERPL-MCNC: 2 MG/DL — SIGNIFICANT CHANGE UP (ref 1.6–2.6)
MCHC RBC-ENTMCNC: 29.4 PG — SIGNIFICANT CHANGE UP (ref 27–34)
MCHC RBC-ENTMCNC: 33.4 GM/DL — SIGNIFICANT CHANGE UP (ref 32–36)
MCV RBC AUTO: 88 FL — SIGNIFICANT CHANGE UP (ref 80–100)
MONOCYTES # BLD AUTO: 0.7 K/UL — SIGNIFICANT CHANGE UP (ref 0–0.9)
MONOCYTES NFR BLD AUTO: 13.3 % — SIGNIFICANT CHANGE UP (ref 2–14)
NEUTROPHILS # BLD AUTO: 2.71 K/UL — SIGNIFICANT CHANGE UP (ref 1.8–7.4)
NEUTROPHILS NFR BLD AUTO: 51.4 % — SIGNIFICANT CHANGE UP (ref 43–77)
NRBC # BLD: 0 /100 WBCS — SIGNIFICANT CHANGE UP (ref 0–0)
PHOSPHATE SERPL-MCNC: 3.4 MG/DL — SIGNIFICANT CHANGE UP (ref 2.5–4.5)
PLATELET # BLD AUTO: 313 K/UL — SIGNIFICANT CHANGE UP (ref 150–400)
POTASSIUM SERPL-MCNC: 4.1 MMOL/L — SIGNIFICANT CHANGE UP (ref 3.5–5.3)
POTASSIUM SERPL-SCNC: 4.1 MMOL/L — SIGNIFICANT CHANGE UP (ref 3.5–5.3)
PROT SERPL-MCNC: 6.9 G/DL — SIGNIFICANT CHANGE UP (ref 6–8.3)
RBC # BLD: 5.34 M/UL — SIGNIFICANT CHANGE UP (ref 4.2–5.8)
RBC # FLD: 11.9 % — SIGNIFICANT CHANGE UP (ref 10.3–14.5)
SODIUM SERPL-SCNC: 136 MMOL/L — SIGNIFICANT CHANGE UP (ref 135–145)
WBC # BLD: 5.26 K/UL — SIGNIFICANT CHANGE UP (ref 3.8–10.5)
WBC # FLD AUTO: 5.26 K/UL — SIGNIFICANT CHANGE UP (ref 3.8–10.5)

## 2020-01-25 PROCEDURE — 99233 SBSQ HOSP IP/OBS HIGH 50: CPT

## 2020-01-25 PROCEDURE — 99223 1ST HOSP IP/OBS HIGH 75: CPT

## 2020-01-25 RX ORDER — CEFTRIAXONE 500 MG/1
INJECTION, POWDER, FOR SOLUTION INTRAMUSCULAR; INTRAVENOUS
Refills: 0 | Status: COMPLETED | OUTPATIENT
Start: 2020-01-25 | End: 2020-01-27

## 2020-01-25 RX ORDER — PREDNISOLONE SODIUM PHOSPHATE 1 %
1 DROPS OPHTHALMIC (EYE)
Refills: 0 | Status: DISCONTINUED | OUTPATIENT
Start: 2020-01-25 | End: 2020-01-26

## 2020-01-25 RX ORDER — METRONIDAZOLE 500 MG
500 TABLET ORAL ONCE
Refills: 0 | Status: COMPLETED | OUTPATIENT
Start: 2020-01-25 | End: 2020-01-25

## 2020-01-25 RX ORDER — CEFTRIAXONE 500 MG/1
1000 INJECTION, POWDER, FOR SOLUTION INTRAMUSCULAR; INTRAVENOUS EVERY 24 HOURS
Refills: 0 | Status: COMPLETED | OUTPATIENT
Start: 2020-01-26 | End: 2020-01-26

## 2020-01-25 RX ORDER — ERYTHROMYCIN BASE 5 MG/GRAM
1 OINTMENT (GRAM) OPHTHALMIC (EYE) AT BEDTIME
Refills: 0 | Status: DISCONTINUED | OUTPATIENT
Start: 2020-01-25 | End: 2020-01-26

## 2020-01-25 RX ORDER — METRONIDAZOLE 500 MG
500 TABLET ORAL EVERY 8 HOURS
Refills: 0 | Status: DISCONTINUED | OUTPATIENT
Start: 2020-01-25 | End: 2020-01-25

## 2020-01-25 RX ORDER — CEFTRIAXONE 500 MG/1
1000 INJECTION, POWDER, FOR SOLUTION INTRAMUSCULAR; INTRAVENOUS ONCE
Refills: 0 | Status: COMPLETED | OUTPATIENT
Start: 2020-01-25 | End: 2020-01-25

## 2020-01-25 RX ORDER — ACETAMINOPHEN 500 MG
650 TABLET ORAL EVERY 6 HOURS
Refills: 0 | Status: DISCONTINUED | OUTPATIENT
Start: 2020-01-25 | End: 2020-01-26

## 2020-01-25 RX ORDER — VANCOMYCIN HCL 1 G
1000 VIAL (EA) INTRAVENOUS EVERY 12 HOURS
Refills: 0 | Status: DISCONTINUED | OUTPATIENT
Start: 2020-01-25 | End: 2020-01-26

## 2020-01-25 RX ORDER — METRONIDAZOLE 500 MG
TABLET ORAL
Refills: 0 | Status: DISCONTINUED | OUTPATIENT
Start: 2020-01-25 | End: 2020-01-25

## 2020-01-25 RX ADMIN — Medication 100 MILLIGRAM(S): at 10:44

## 2020-01-25 RX ADMIN — Medication 250 MILLIGRAM(S): at 17:50

## 2020-01-25 RX ADMIN — Medication 1 DROP(S): at 19:41

## 2020-01-25 RX ADMIN — CEFTRIAXONE 100 MILLIGRAM(S): 500 INJECTION, POWDER, FOR SOLUTION INTRAMUSCULAR; INTRAVENOUS at 14:35

## 2020-01-25 RX ADMIN — Medication 1 DROP(S): at 23:35

## 2020-01-25 RX ADMIN — Medication 200 MILLIGRAM(S): at 20:27

## 2020-01-25 RX ADMIN — Medication 1 APPLICATION(S): at 21:14

## 2020-01-25 RX ADMIN — Medication 100 MILLIGRAM(S): at 00:19

## 2020-01-25 RX ADMIN — Medication 250 MILLIGRAM(S): at 06:02

## 2020-01-25 NOTE — CONSULT NOTE ADULT - ASSESSMENT
54 M hx of EBV infection, p/w progressive R eye pain 2/2 R preseptal cellulitis.    - c/w vancomycin  - d/c levaquin and flagyl   - pt willing to try cephalosporin- start ceftriaxone 1g IV q24h (monitor for allergic reaction)  - ophthalmology following  - follow up BCx    d/w primary team 54 M hx of EBV infection, p/w progressive R eye pain 2/2 R preseptal cellulitis.     - c/w vancomycin  - d/c levaquin and flagyl   - pt willing to try cephalosporin- start ceftriaxone 1g IV q24h (monitor for allergic reaction)  - ophthalmology following  - follow up BCx    d/w primary team

## 2020-01-25 NOTE — PROGRESS NOTE ADULT - SUBJECTIVE AND OBJECTIVE BOX
Contact Information:  Wade Barillas II, MD, MPH  PGY-1, Internal Medicine  Pager: [phone] ([facility]) /// [phone] ([facility])    TEENA NAJERA, MRN-[id number]    Patient is a 54y old  Male who presents with a chief complaint of preseptal cellulitis refractory to oral antibiotics (24 Jan 2020 22:48)      OVERNIGHT EVENTS:    SUBJECTIVE:    CONSTITUTIONAL: No weakness. No fatigue. No fever.  HEAD: No head trauma.   EYES: No vision changes.  ENT: No hearing changes or tinnitus. No ear pain. No changes in smell. No nasal congestion or discharge. No sore throat. No voice hoarseness.   NECK: No neck pain or stiffness. No lumps.  RESPIRATORY: No cough. No SOB. No wheezing. No hemoptysis.   CARDIOVASCULAR: No chest pain. No palpitations.   GASTROINTESTINAL: No dysphagia. No ABD pain. No distension. No constipation. No diarrhea. No pain with defecation. No hematemesis. No hematochezia or melena.  BACK: No back pain.  GENITOURINARY: No dysuria. No frequency or urgency. No hesitancy. No incontinence. No urinary retention. No suprapubic pain. No hematuria.  EXTREMITY: No swelling.  MUSCULOSKELETAL: No joint pain or swelling. No fractures. No stiffness.    SKIN: No rashes. No itching. No skin, hair, or nail changes.  NEUROLOGICAL: No weakness or paralysis. No lightheadedness or dizziness. No HA. No numbness or tingling.   PSYCHIATRIC: No depression.       OBJECTIVE:  Vital Signs Last 24 Hrs  T(C): 36.7 (25 Jan 2020 05:19), Max: 36.8 (24 Jan 2020 17:00)  T(F): 98.1 (25 Jan 2020 05:19), Max: 98.2 (24 Jan 2020 17:00)  HR: 72 (25 Jan 2020 05:19) (72 - 77)  BP: 114/75 (25 Jan 2020 05:19) (114/75 - 140/82)  BP(mean): --  RR: 18 (25 Jan 2020 05:19) (16 - 18)  SpO2: 96% (25 Jan 2020 05:19) (96% - 97%)  I&O's Summary      MEDICATIONS  (STANDING):  erythromycin   Ointment 1 Application(s) Right EYE at bedtime  levoFLOXacin IVPB      metroNIDAZOLE  IVPB      metroNIDAZOLE  IVPB 500 milliGRAM(s) IV Intermittent every 8 hours  vancomycin  IVPB 1000 milliGRAM(s) IV Intermittent every 12 hours    MEDICATIONS  (PRN):  acetaminophen   Tablet .. 650 milliGRAM(s) Oral every 6 hours PRN Temp greater or equal to 38C (100.4F), Mild Pain (1 - 3), Moderate Pain (4 - 6), Severe Pain (7 - 10)    Allergies    penicillin (Rash)    Intolerances        CONSTITUTIONAL: No acute distress. Awake and alert.  HEAD: No evidence of trauma. Structures WNL.  EYES: +PERRL. +EOMI. No scleral icterus. No conjunctival injection.  ENT: Moist oral mucosa. No erythema. No pharyngeal exudates.   NECK: Supple. Appropriate ROM. No stiffness. No masses or lymphadenopathy.  RESPIRATORY: CTAB. No wheezes, rales, or rhonchi. No accessory muscle use. No apparent respiratory distress.  CARDIOVASCULAR: +S1/S2. No audible S3/S4. Regular rate and rhythm. No murmurs, rubs, or gallops. 2+ radial pulses x b/l UE; 2+ DP pulses x b/l LE.   GASTROINTESTINAL: Soft, nontender, nondistended. +BS. No rebound or guarding.   BACK: No spinal or paraspinal tenderness. No CVA tenderness.  EXTREMITY: No LE swelling or edema. EXTs warm to touch.  MUSCULOSKELETAL: Movement evident in all limbs. No tenderness on palpation.  DERMATOLOGICAL: No abnormal rashes or lesions.  NEUROLOGICAL: CN 2-12 grossly intact. No focal deficits. Sensation intact x 4EXT. A&Ox3 (oriented to person, place, and time).  PSYCHIATRIC: Appropriate affect.                            16.6   7.04  )-----------( 353      ( 24 Jan 2020 20:48 )             48.5     PT/INR - ( 24 Jan 2020 20:48 )   PT: 12.7 sec;   INR: 1.10 ratio         PTT - ( 24 Jan 2020 20:48 )  PTT:33.9 sec  01-24    139  |  98  |  18  ----------------------------<  90  4.0   |  28  |  1.27    Ca    10.4      24 Jan 2020 20:48    TPro  8.1  /  Alb  5.0  /  TBili  1.1  /  DBili  x   /  AST  21  /  ALT  21  /  AlkPhos  67  01-24    CAPILLARY BLOOD GLUCOSE        LIVER FUNCTIONS - ( 24 Jan 2020 20:48 )  Alb: 5.0 g/dL / Pro: 8.1 g/dL / ALK PHOS: 67 U/L / ALT: 21 U/L / AST: 21 U/L / GGT: x                       RADIOLOGY AND ADDITIONAL TESTS:    CONSULTANT NOTES REVIEWED:    CARE DISCUSSED WITH THE FOLLOWING CONSULTANTS/PROVIDERS: Contact Information:  Wade Barillas II, MD, MPH  PGY-1, Internal Medicine  Pager: [phone] ([facility]) /// [phone] ([facility])    TEENA NAJERA, MRN-[id number]    Patient is a 54y old  Male who presents with a chief complaint of preseptal cellulitis refractory to oral antibiotics (24 Jan 2020 22:48)      OVERNIGHT EVENTS: Admitted due to R eye pain, radiological evidence of periorbital cellulitis not improved on Abx.    SUBJECTIVE:    CONSTITUTIONAL: No weakness. No fatigue. No fever.  HEAD: No head trauma.   EYES: No vision changes.  ENT: No hearing changes or tinnitus. No ear pain. No changes in smell. No nasal congestion or discharge. No sore throat. No voice hoarseness.   NECK: No neck pain or stiffness. No lumps.  RESPIRATORY: No cough. No SOB. No wheezing. No hemoptysis.   CARDIOVASCULAR: No chest pain. No palpitations.   GASTROINTESTINAL: No dysphagia. No ABD pain. No distension. No constipation. No diarrhea. No pain with defecation. No hematemesis. No hematochezia or melena.  BACK: No back pain.  GENITOURINARY: No dysuria. No frequency or urgency. No hesitancy. No incontinence. No urinary retention. No suprapubic pain. No hematuria.  EXTREMITY: No swelling.  MUSCULOSKELETAL: No joint pain or swelling. No fractures. No stiffness.    SKIN: No rashes. No itching. No skin, hair, or nail changes.  NEUROLOGICAL: No weakness or paralysis. No lightheadedness or dizziness. No HA. No numbness or tingling.   PSYCHIATRIC: No depression.       OBJECTIVE:  Vital Signs Last 24 Hrs  T(C): 36.7 (25 Jan 2020 05:19), Max: 36.8 (24 Jan 2020 17:00)  T(F): 98.1 (25 Jan 2020 05:19), Max: 98.2 (24 Jan 2020 17:00)  HR: 72 (25 Jan 2020 05:19) (72 - 77)  BP: 114/75 (25 Jan 2020 05:19) (114/75 - 140/82)  BP(mean): --  RR: 18 (25 Jan 2020 05:19) (16 - 18)  SpO2: 96% (25 Jan 2020 05:19) (96% - 97%)  I&O's Summary      MEDICATIONS  (STANDING):  erythromycin   Ointment 1 Application(s) Right EYE at bedtime  levoFLOXacin IVPB      metroNIDAZOLE  IVPB      metroNIDAZOLE  IVPB 500 milliGRAM(s) IV Intermittent every 8 hours  vancomycin  IVPB 1000 milliGRAM(s) IV Intermittent every 12 hours    MEDICATIONS  (PRN):  acetaminophen   Tablet .. 650 milliGRAM(s) Oral every 6 hours PRN Temp greater or equal to 38C (100.4F), Mild Pain (1 - 3), Moderate Pain (4 - 6), Severe Pain (7 - 10)    Allergies    penicillin (Rash)    Intolerances        CONSTITUTIONAL: No acute distress. Awake and alert.  HEAD: No evidence of trauma. Structures WNL.  EYES: +PERRL. +EOMI. No scleral icterus. No conjunctival injection.  ENT: Moist oral mucosa. No erythema. No pharyngeal exudates.   NECK: Supple. Appropriate ROM. No stiffness. No masses or lymphadenopathy.  RESPIRATORY: CTAB. No wheezes, rales, or rhonchi. No accessory muscle use. No apparent respiratory distress.  CARDIOVASCULAR: +S1/S2. No audible S3/S4. Regular rate and rhythm. No murmurs, rubs, or gallops. 2+ radial pulses x b/l UE; 2+ DP pulses x b/l LE.   GASTROINTESTINAL: Soft, nontender, nondistended. +BS. No rebound or guarding.   BACK: No spinal or paraspinal tenderness. No CVA tenderness.  EXTREMITY: No LE swelling or edema. EXTs warm to touch.  MUSCULOSKELETAL: Movement evident in all limbs. No tenderness on palpation.  DERMATOLOGICAL: No abnormal rashes or lesions.  NEUROLOGICAL: CN 2-12 grossly intact. No focal deficits. Sensation intact x 4EXT. A&Ox3 (oriented to person, place, and time).  PSYCHIATRIC: Appropriate affect.                            16.6   7.04  )-----------( 353      ( 24 Jan 2020 20:48 )             48.5     PT/INR - ( 24 Jan 2020 20:48 )   PT: 12.7 sec;   INR: 1.10 ratio         PTT - ( 24 Jan 2020 20:48 )  PTT:33.9 sec  01-24    139  |  98  |  18  ----------------------------<  90  4.0   |  28  |  1.27    Ca    10.4      24 Jan 2020 20:48    TPro  8.1  /  Alb  5.0  /  TBili  1.1  /  DBili  x   /  AST  21  /  ALT  21  /  AlkPhos  67  01-24    CAPILLARY BLOOD GLUCOSE        LIVER FUNCTIONS - ( 24 Jan 2020 20:48 )  Alb: 5.0 g/dL / Pro: 8.1 g/dL / ALK PHOS: 67 U/L / ALT: 21 U/L / AST: 21 U/L / GGT: x                       RADIOLOGY AND ADDITIONAL TESTS:    CONSULTANT NOTES REVIEWED:    CARE DISCUSSED WITH THE FOLLOWING CONSULTANTS/PROVIDERS: Contact Information:  Wade Barillas II, MD, MPH  PGY-1, Internal Medicine  Pager: [phone] ([facility]) /// [phone] ([facility])    TEENA NAJERA, MRN-[id number]    Patient is a 54y old  Male who presents with a chief complaint of preseptal cellulitis refractory to oral antibiotics (24 Jan 2020 22:48)      OVERNIGHT EVENTS: Admitted due to R eye pain, radiological evidence of periorbital cellulitis not improved on Abx.    SUBJECTIVE: Patient evaluated at bedside, c/o R eye redness, slight pain, and blurry vision due tearing, also a sensation of eye dryness; also endorses slight HA which he attributes to being tired. Denies fever, N/V, lightheadedness, dizziness, cough.    EYES: +R eye redness, slight pain, blurry vision 2/2 tearing  NEUROLOGICAL: +Slight HA      OBJECTIVE:  Vital Signs Last 24 Hrs  T(C): 36.7 (25 Jan 2020 05:19), Max: 36.8 (24 Jan 2020 17:00)  T(F): 98.1 (25 Jan 2020 05:19), Max: 98.2 (24 Jan 2020 17:00)  HR: 72 (25 Jan 2020 05:19) (72 - 77)  BP: 114/75 (25 Jan 2020 05:19) (114/75 - 140/82)  BP(mean): --  RR: 18 (25 Jan 2020 05:19) (16 - 18)  SpO2: 96% (25 Jan 2020 05:19) (96% - 97%)  I&O's Summary      MEDICATIONS  (STANDING):  erythromycin   Ointment 1 Application(s) Right EYE at bedtime  levoFLOXacin IVPB      metroNIDAZOLE  IVPB      metroNIDAZOLE  IVPB 500 milliGRAM(s) IV Intermittent every 8 hours  vancomycin  IVPB 1000 milliGRAM(s) IV Intermittent every 12 hours    MEDICATIONS  (PRN):  acetaminophen   Tablet .. 650 milliGRAM(s) Oral every 6 hours PRN Temp greater or equal to 38C (100.4F), Mild Pain (1 - 3), Moderate Pain (4 - 6), Severe Pain (7 - 10)    Allergies    penicillin (Rash)    Intolerances        CONSTITUTIONAL: Sitting upright in bed. No acute distress.  EYES: +PERRL. +EOMI. No scleral icterus. No conjunctival injection.  ENT: Moist oral mucosa. No erythema. No pharyngeal exudates.   NECK: Supple. Appropriate ROM. No stiffness. No masses or lymphadenopathy.  RESPIRATORY: CTAB. No wheezes, rales, or rhonchi. No accessory muscle use. No apparent respiratory distress.  CARDIOVASCULAR: +S1/S2. No audible S3/S4. Regular rate and rhythm. No murmurs, rubs, or gallops. 2+ radial pulses x b/l UE; 2+ DP pulses x b/l LE.   GASTROINTESTINAL: Soft, nontender, nondistended. +BS. No rebound or guarding.   BACK: No spinal or paraspinal tenderness. No CVA tenderness.  EXTREMITY: No LE swelling or edema. EXTs warm to touch.  MUSCULOSKELETAL: Movement evident in all limbs. No tenderness on palpation.  DERMATOLOGICAL: No abnormal rashes or lesions.  NEUROLOGICAL: CN 2-12 grossly intact. No focal deficits. Sensation intact x 4EXT. A&Ox3 (oriented to person, place, and time).  PSYCHIATRIC: Appropriate affect.                            16.6   7.04  )-----------( 353      ( 24 Jan 2020 20:48 )             48.5     PT/INR - ( 24 Jan 2020 20:48 )   PT: 12.7 sec;   INR: 1.10 ratio         PTT - ( 24 Jan 2020 20:48 )  PTT:33.9 sec  01-24    139  |  98  |  18  ----------------------------<  90  4.0   |  28  |  1.27    Ca    10.4      24 Jan 2020 20:48    TPro  8.1  /  Alb  5.0  /  TBili  1.1  /  DBili  x   /  AST  21  /  ALT  21  /  AlkPhos  67  01-24    CAPILLARY BLOOD GLUCOSE        LIVER FUNCTIONS - ( 24 Jan 2020 20:48 )  Alb: 5.0 g/dL / Pro: 8.1 g/dL / ALK PHOS: 67 U/L / ALT: 21 U/L / AST: 21 U/L / GGT: x                       RADIOLOGY AND ADDITIONAL TESTS:    CONSULTANT NOTES REVIEWED:    CARE DISCUSSED WITH THE FOLLOWING CONSULTANTS/PROVIDERS: Contact Information:  Wade Barillas II, MD, MPH  PGY-1, Internal Medicine  Pager: [phone] ([facility]) /// [phone] ([facility])    TEENA NAJERA, MRN-[id number]    Patient is a 54y old  Male who presents with a chief complaint of preseptal cellulitis refractory to oral antibiotics (24 Jan 2020 22:48)      OVERNIGHT EVENTS: Admitted due to R eye pain, radiological evidence of periorbital cellulitis not improved on Abx.    SUBJECTIVE: Patient evaluated at bedside, c/o R eye redness, slight pain, and blurry vision due tearing, also a sensation of eye dryness; also endorses slight HA which he attributes to being tired. Denies fever, N/V, lightheadedness, dizziness, cough.    EYES: +R eye redness, slight pain, blurry vision 2/2 tearing  NEUROLOGICAL: +Slight HA      OBJECTIVE:  Vital Signs Last 24 Hrs  T(C): 36.7 (25 Jan 2020 05:19), Max: 36.8 (24 Jan 2020 17:00)  T(F): 98.1 (25 Jan 2020 05:19), Max: 98.2 (24 Jan 2020 17:00)  HR: 72 (25 Jan 2020 05:19) (72 - 77)  BP: 114/75 (25 Jan 2020 05:19) (114/75 - 140/82)  BP(mean): --  RR: 18 (25 Jan 2020 05:19) (16 - 18)  SpO2: 96% (25 Jan 2020 05:19) (96% - 97%)  I&O's Summary      MEDICATIONS  (STANDING):  erythromycin   Ointment 1 Application(s) Right EYE at bedtime  levoFLOXacin IVPB      metroNIDAZOLE  IVPB      metroNIDAZOLE  IVPB 500 milliGRAM(s) IV Intermittent every 8 hours  vancomycin  IVPB 1000 milliGRAM(s) IV Intermittent every 12 hours    MEDICATIONS  (PRN):  acetaminophen   Tablet .. 650 milliGRAM(s) Oral every 6 hours PRN Temp greater or equal to 38C (100.4F), Mild Pain (1 - 3), Moderate Pain (4 - 6), Severe Pain (7 - 10)    Allergies    penicillin (Rash)    Intolerances        CONSTITUTIONAL: Sitting upright in bed. No acute distress.  HEAD: No sinus or maxillary tenderness.  EYES: +R eye erythema within the sclera with visible lacrimation. R lower eyelid swelling. Tenderness to palpation of R lower and upper eyelids. +EOMI with very slight discomfort in the R eye. +PERRL.  ENT: Moist oral mucosa. No pharyngeal exudates.   RESPIRATORY: CTAB. No wheezes, rales, or rhonchi. No accessory muscle use. No apparent respiratory distress.  CARDIOVASCULAR: +S1/S2. Regular rate and rhythm. No murmurs, rubs, or gallops.   GASTROINTESTINAL: Soft, nontender, nondistended. +BS.   EXTREMITY: No LE swelling or edema. EXTs warm to touch.  MUSCULOSKELETAL: Spontaneous movement in all limbs. No tenderness on palpation.  NEUROLOGICAL: CN 2-12 grossly intact. No focal deficits. A&Ox3 (oriented to person, place, and time).                          16.6   7.04  )-----------( 353      ( 24 Jan 2020 20:48 )             48.5     PT/INR - ( 24 Jan 2020 20:48 )   PT: 12.7 sec;   INR: 1.10 ratio         PTT - ( 24 Jan 2020 20:48 )  PTT:33.9 sec  01-24    139  |  98  |  18  ----------------------------<  90  4.0   |  28  |  1.27    Ca    10.4      24 Jan 2020 20:48    TPro  8.1  /  Alb  5.0  /  TBili  1.1  /  DBili  x   /  AST  21  /  ALT  21  /  AlkPhos  67  01-24    CAPILLARY BLOOD GLUCOSE        LIVER FUNCTIONS - ( 24 Jan 2020 20:48 )  Alb: 5.0 g/dL / Pro: 8.1 g/dL / ALK PHOS: 67 U/L / ALT: 21 U/L / AST: 21 U/L / GGT: x                       RADIOLOGY AND ADDITIONAL TESTS:    CONSULTANT NOTES REVIEWED:    CARE DISCUSSED WITH THE FOLLOWING CONSULTANTS/PROVIDERS: Contact Information:  Wade Barillas II, MD, MPH  PGY-1, Internal Medicine  Pager: [phone] ([facility]) /// [phone] ([facility])    TEENA NAJERA, MRN-[id number]    Patient is a 54y old  Male who presents with a chief complaint of preseptal cellulitis refractory to oral antibiotics (24 Jan 2020 22:48)      OVERNIGHT EVENTS: Admitted due to R eye pain, radiological evidence of periorbital cellulitis not improved on Abx.    SUBJECTIVE: Patient evaluated at bedside, c/o R eye redness, slight pain, and blurry vision due tearing, also a sensation of eye dryness; also endorses slight HA which he attributes to being tired. Denies fever, N/V, lightheadedness, dizziness, cough.    EYES: +R eye redness, slight pain, blurry vision 2/2 tearing  NEUROLOGICAL: +Slight HA      OBJECTIVE:  Vital Signs Last 24 Hrs  T(C): 36.7 (25 Jan 2020 05:19), Max: 36.8 (24 Jan 2020 17:00)  T(F): 98.1 (25 Jan 2020 05:19), Max: 98.2 (24 Jan 2020 17:00)  HR: 72 (25 Jan 2020 05:19) (72 - 77)  BP: 114/75 (25 Jan 2020 05:19) (114/75 - 140/82)  BP(mean): --  RR: 18 (25 Jan 2020 05:19) (16 - 18)  SpO2: 96% (25 Jan 2020 05:19) (96% - 97%)  I&O's Summary      MEDICATIONS  (STANDING):  erythromycin   Ointment 1 Application(s) Right EYE at bedtime  levoFLOXacin IVPB      metroNIDAZOLE  IVPB      metroNIDAZOLE  IVPB 500 milliGRAM(s) IV Intermittent every 8 hours  vancomycin  IVPB 1000 milliGRAM(s) IV Intermittent every 12 hours    MEDICATIONS  (PRN):  acetaminophen   Tablet .. 650 milliGRAM(s) Oral every 6 hours PRN Temp greater or equal to 38C (100.4F), Mild Pain (1 - 3), Moderate Pain (4 - 6), Severe Pain (7 - 10)    Allergies    penicillin (Rash)    Intolerances        CONSTITUTIONAL: Sitting upright in bed. No acute distress.  HEAD: No frontal or maxillary sinus tenderness.  EYES: +R eye erythema within the sclera with visible lacrimation. R lower eyelid swelling. Tenderness to palpation of R lower and upper eyelids. +EOMI with very slight discomfort in the R eye. +PERRL.  ENT: Moist oral mucosa. No pharyngeal exudates. No nasal congestion.  RESPIRATORY: CTAB. No wheezes, rales, or rhonchi. No accessory muscle use. No apparent respiratory distress.  CARDIOVASCULAR: +S1/S2. Regular rate and rhythm. No murmurs, rubs, or gallops.   GASTROINTESTINAL: Soft, nontender, nondistended. +BS.   EXTREMITY: No LE swelling or edema. EXTs warm to touch.  MUSCULOSKELETAL: Spontaneous movement in all limbs.   NEUROLOGICAL: CN 2-12 grossly intact. No focal deficits. A&Ox3.                          16.6   7.04  )-----------( 353      ( 24 Jan 2020 20:48 )             48.5     PT/INR - ( 24 Jan 2020 20:48 )   PT: 12.7 sec;   INR: 1.10 ratio         PTT - ( 24 Jan 2020 20:48 )  PTT:33.9 sec  01-24    139  |  98  |  18  ----------------------------<  90  4.0   |  28  |  1.27    Ca    10.4      24 Jan 2020 20:48    TPro  8.1  /  Alb  5.0  /  TBili  1.1  /  DBili  x   /  AST  21  /  ALT  21  /  AlkPhos  67  01-24    CAPILLARY BLOOD GLUCOSE        LIVER FUNCTIONS - ( 24 Jan 2020 20:48 )  Alb: 5.0 g/dL / Pro: 8.1 g/dL / ALK PHOS: 67 U/L / ALT: 21 U/L / AST: 21 U/L / GGT: x                       RADIOLOGY AND ADDITIONAL TESTS:    CONSULTANT NOTES REVIEWED:    CARE DISCUSSED WITH THE FOLLOWING CONSULTANTS/PROVIDERS:

## 2020-01-25 NOTE — PATIENT PROFILE ADULT - NSPROGENANESREACTION_GEN_A_NUR
SUBJECTIVE: Dick Anderson is a 74 y.o. male who returns to the office with chief complaint of painful fungal toenails. Patient relates toe nails are thickened/difficult to trim as well as painful with ambulation and with shoe gear. Review of Systems   Constitutional: Negative for activity change, appetite change, chills, diaphoresis, fatigue and fever. Respiratory: Negative for shortness of breath. Cardiovascular: Negative for leg swelling. Gastrointestinal: Negative for diarrhea and nausea. Endocrine: Negative for cold intolerance, heat intolerance and polyuria. Musculoskeletal: Positive for arthralgias. Negative for back pain, gait problem, joint swelling and myalgias. Skin: Negative for color change, pallor, rash and wound. Allergic/Immunologic: Negative for environmental allergies and food allergies. Neurological: Negative for dizziness, weakness, light-headedness and numbness. Hematological: Does not bruise/bleed easily. Psychiatric/Behavioral: Negative for behavioral problems, confusion and self-injury. The patient is not nervous/anxious. OBJECTIVE: Clinical evaluation of patient reveals nails 1,2,3,4,5 of the right foot and nails 1,2,3,4,5, of the left foot to present with thickness, elongation, discoloration, brittleness, and subungual debris. There was pain with palpation and debridement of the toenails of the bilateral feet. No open lesions noted to either foot today. There are preulcerative lesions noted to the right foot submetatarsal head two and three. There is pain with direct palpation of these callouses with direct pressure. Debridement of these callouses with a fifteen blade does not reveal any central core or pinpoint bleeding. There are no signs of bacterial infection noted to these callouses. There is no pain with palpation to the posterior aspect of the left calcaneus. There is no pain with palpation to the left achilles tendon.  There is no soft tissue swelling to event. Return in about 9 weeks (around 11/26/2018) for At risk diabetic foot care.    9/24/2018      Eula Martínez DPM no previous reaction

## 2020-01-25 NOTE — PROGRESS NOTE ADULT - PROBLEM SELECTOR PLAN 2
-overall low vte risk, improve VTE 0. encourae ambulation. - DVT: IMPROVE score 0 - no pharmacological prophylaxis  - Dispo: Pending, likely home  - Diet: Regular

## 2020-01-25 NOTE — PROGRESS NOTE ADULT - PROBLEM SELECTOR PLAN 1
-erythema, chemosis, tearing, pain of R eye, unclear precipitant. Interval repeat CT orbits confirms slightly worsened preseptal cellulitis WITHOUT evidence of orbital cellulitis  -optho eval appreciated, c/w IV abx, erythromycin ointment over R eyelid margin QHS.   -Pt offered IV vanco and IV ceftriaxone.  Pt with noted PCN allergy as child (reported as possible hives/rash); I explained at length the overall relatively low risk of cross reactivity with cephalosporins especially with no documented anaphylaxis to PCN in past.  Patient still hesitant, wants to avoid cephalosporins unless absolutely necessary.   -Will start IV vancomycin, IV levaquin, and add IV flagyl for anaerobic coverage given concurrent sinusitis and suspected oral/sinus translocation as possible source. D/w ID fellow overnight, in agreement, pt to be seen by their service in am.   -consider addition of acyclovir for hx of EBV though efficacy of acyclovir for latent, non-actively replicating EBV infections is not supported. Defer to ID for addition of antivirals to treatment regimen.   -f/u bcx  -serial eye exams, optho f/u - R eye redness, swelling, tearing, lacrimation, etiology unclear.  - Interval repeat CT orbits confirms slightly worsened preseptal cellulitis WITHOUT evidence of orbital cellulitis  - Optho eval appreciated, c/w IV abx, erythromycin ointment over R eyelid margin QHS.   - Patient notes PCN allergy as a child (hives/rash); made aware of minimal risk of cross-reactivity w/cephalosporins, still hesitant. C/w IV vancomycin, IV levaquin, IV flagyl for anaerobic coverage given concurrent sinusitis and suspected oral/sinus translocation as possible source.   - Formal ID consult pending   - Given PMHx of chronic EBV, will consider antiviral coverage, will discuss with ID   - F/u Bcx  - Serial eye exams, optho f/u - R eye redness, swelling, tearing, lacrimation, etiology unclear.  - Interval repeat CT orbits confirms slightly worsened preseptal cellulitis WITHOUT evidence of orbital cellulitis  - Optho eval appreciated, c/w IV abx, erythromycin ointment over R eyelid margin QHS.   - Patient notes PCN allergy as a child (hives/rash); made aware of minimal risk of cross-reactivity w/cephalosporins, still hesitant. C/w IV vancomycin, IV levaquin, IV flagyl for anaerobic coverage given concurrent sinusitis and suspected oral/sinus translocation as possible source.   - Formal ID consult pending   - Given PMHx of chronic EBV, will consider antiviral coverage, will discuss with ID   - F/u Bcx  - Serial eye exams, ophthalmology f/u - R eye redness, swelling, tearing, lacrimation; etiology unclear.  - Ddx: preseptal cellulitis vs conjunctivitis  - Interval repeat CT orbits confirms slightly worsened preseptal cellulitis WITHOUT evidence of orbital cellulitis  - Patient notes PCN allergy as a child (hives/rash); made aware of minimal risk of cross-reactivity w/cephalosporins, amenable to trying ceftriaxone  - Optho eval appreciated, process confined to globe; c/w abx + Pred Forte eye drops  - ID consult - c/w vancomycin and ceftriaxone; flagyl and levaquin dc'd. Monitor for reaction to cephalosporin  - F/u Bcx  - Serial eye exams; Ophtho following

## 2020-01-25 NOTE — CONSULT NOTE ADULT - ATTENDING COMMENTS
Kavon Espinosa MD   Infectious Disease   Pager [phone]   After 5PM and on weekends please page fellow on call or call [phone] Treating as pre-septal cellulitis. No orbital cellulitis on CT.   Could be primarily conjunctivitis, Ophthalmology evaluation appreciated.   Anticipate eventual discharge on Bactrim and Vantin for a 7 day course with close Ophtho follow up.     Kavon Espinosa MD   Infectious Disease   Pager [phone]   After 5PM and on weekends please page fellow on call or call [phone]

## 2020-01-25 NOTE — PROGRESS NOTE ADULT - ASSESSMENT
54 M hx of EBV infection, p/w progressive R eye pain, f/w preseptal cellulitis. 54M w/ self-reported PMHx chronic EBV infection, p/w progressive R eye pain/watering/pain x 4 days; outpatient CT significant for periorbital swelling/preseptal cellulitis without orbital cellulitis.

## 2020-01-25 NOTE — PROGRESS NOTE ADULT - SUBJECTIVE AND OBJECTIVE BOX
[facility] Ophthalmology Follow Up Note    Interval: Pt still has swelling of right eye, states pain of the right eyelid is improved, still has some discomfort with eye movement.     Mood and Affect Appropriate ( x ),  Oriented to Time, Place, and Person x 3 ( x )    Ophthalmology Exam    Visual acuity (sc): OD: 20/40 pinhole improves to 20/25 , OS: 20/40 pinhole improves to 20/25   Pupils: PERRL OU, no APD  Ttono: 17 OD, 15 OS   Extraocular movements (EOMs): Full OU, no pain, no diplopia  Confrontational Visual Field (CVF):  Full OU  Color Plates: 12/12 OU    Slit Lamp Exam (SLE)  External:  OD: tender to palpation over the periorbit, upper and lower eyelids, and right maxilla, which appears edematous Flat OS, no resistance to retropulsion OU   Lids/Lashes/Lacrimal Ducts: Flat OU    Sclera/Conjunctiva:  OD: +3 chemosis of the bulbar conjunctiva, +2 injection diffuse of the conjunctiva, W+Q OS. OD: due to chemosis of the bulbar conjunctiva, the lid cannot completely close, leaving protruding shelf of bulbar conjunctiva,   Cornea: Cl OU, has circumferential keratotomy incisions OU   Anterior Chamber: D+Q OU   Iris:  Flat OU  Lens:  Cl OU      Assessment and Plan:   55 yo M dx with right sided preseptal cellulitis on 1/21/22, put on on PO Doxycycline,, and presented on 1/24/20 from private ophthalmologist's office (Dr Brendan Gagnon of [facility] ([phone]), , with progression of right eye injection and chemosis, pain with EOM OD for concern of orbital cellulitis of the right eye. Repeat CT orbit on 1/24/20 demonstrates right pre-septal and periorbital soft tissue swelling and inflammatory changes, suggestive of preseptal cellulitis.     incomplete note - will discuss with Cornea Attending  - given that imaging negative is negative for post-septal inflammation, and clinically the chemosis and injection seems very confined to the globe itself, this raises the possibility of a bulbar process such as episcleritis vs severe conjunctivitis  - Pt afebrile, WBC wnl,   - recommend generous Erythromycin ointment QHS over the right eyelid margin and over the protruding injected conjunctiva to prevent breakdown overnight.   - will follow closely [facility] Ophthalmology Follow Up Note    Interval: Pt still has swelling of right eye, states pain of the right eyelid is improved, still has some discomfort with eye movement.     Mood and Affect Appropriate ( x ),  Oriented to Time, Place, and Person x 3 ( x )    Ophthalmology Exam    Visual acuity (sc): OD: 20/40 pinhole improves to 20/25 , OS: 20/40 pinhole improves to 20/25   Pupils: PERRL OU, no APD  Ttono: 17 OD, 15 OS   Extraocular movements (EOMs): Full OU, no pain, no diplopia  Confrontational Visual Field (CVF):  Full OU  Color Plates: 12/12 OU    Slit Lamp Exam (SLE)  External:  OD: tender to palpation over the periorbit, upper and lower eyelids, and right maxilla, which appears edematous Flat OS, no resistance to retropulsion OU   Lids/Lashes/Lacrimal Ducts: Flat OU    Sclera/Conjunctiva:  OD: +3 chemosis of the bulbar conjunctiva, +2 injection diffuse of the conjunctiva, W+Q OS. OD: due to chemosis of the bulbar conjunctiva, the lid cannot completely close, leaving protruding shelf of bulbar conjunctiva,   Cornea: Cl OU, has circumferential keratotomy incisions OU   Anterior Chamber: D+Q OU   Iris:  Flat OU  Lens:  Cl OU    B-scan OD: no masses, detachments or thickening of the sclera demonstrated     Assessment and Plan:   53 yo M dx with right sided preseptal cellulitis on 1/21/22, put on on PO Doxycycline,, and presented on 1/24/20 from private ophthalmologist's office (Dr Brendan Gagnon of [facility] ([phone]), , with progression of right eye injection and chemosis, pain with EOM OD for concern of orbital cellulitis of the right eye. Repeat CT orbit on 1/24/20 demonstrates right pre-septal and periorbital soft tissue swelling and inflammatory changes, suggestive of preseptal cellulitis.     - given that imaging negative is negative for post-septal inflammation, and clinically the chemosis and injection seems very confined to the globe itself, this raises the possibility of a bulbar process such as severe conjunctivitis. It is reasonable to observe patient for another day on antibiotics. AdenoPlus swab was negative, but this does not rule out viral conjunctivitis  - Pt afebrile, WBC wnl,   - recommend generous Erythromycin ointment BID over the right eyelid margin and over the protruding injected conjunctiva to prevent breakdown overnight.   - recommend Pred Forte eye drops QID OD   - will follow closely       DW Dr Sherri Wen ([place]orne[place] Attending)

## 2020-01-25 NOTE — CONSULT NOTE ADULT - SUBJECTIVE AND OBJECTIVE BOX
HPI: 54 M hx of EBV infection, p/w progressive R eye pain. On 1/21 noticed injected R sclera. Went to [facility] and given doxycycline and polymyxin eye gtt. Saw his optometrist 1/22 and sent to [place] ED for CT orbit which showed preseptal cellulitis. D/C on doxycycline and went to ophthalmologist on Friday who sent him back to ED. Repeat CT Orbit shows no abscess and no post-septal involvement. Pt thinks overall his eye pain and swelling has improved.    Had PCN allergy when 2 yo w hives. Doesn't know if he has taken beta-lactams since then. Has dog at home- dog is fine. Family has been sick w URI these past couple weeks. He had a cough and some sinus congestion that started 2 weeks ago- overall improved. Had deep teeth cleaning 2-3 weeks ago; no oral pain. No fevers or chills.        PAST MEDICAL & SURGICAL HISTORY:  Chronic James Barr virus (EBV) infection  S/P appendectomy      Allergies    penicillin (Rash)    Intolerances        ANTIMICROBIALS:  cefTRIAXone   IVPB    cefTRIAXone   IVPB 1000 once  vancomycin  IVPB 1000 every 12 hours      OTHER MEDS:  acetaminophen   Tablet .. 650 milliGRAM(s) Oral every 6 hours PRN  erythromycin   Ointment 1 Application(s) Right EYE at bedtime      SOCIAL HISTORY:  Substance Use (street drugs): denies  Tobacco Usage:  denies  Alcohol Usage: denies    FAMILY HISTORY:  No pertinent family history in first degree relatives      ROS:  All other systems negative     Constitutional: no fever, no chill  Eye: R eye pain, R eye redness, no vision changes  ENT:  no sore throat, no rhinorrhea  Cardiovascular:  no chest pain, no palpitation  Respiratory:  no SOB, improving cough  GI:  no abd pain, no vomiting, no diarrhea  urinary: no dysuria  musculoskeletal:  no joint pain, no joint swelling  skin:  no rash  neurology:  no headache     Physical Exam:    General: NAD, non toxic  Head: atraumatic, normocephalic  Eyes: R eye erythematous w some surrounding edema  ENT:   no oropharyngeal lesions, no LAD  Cardio:    regular S1,S2  Respiratory:   clear b/l, no wheezing  abd:   soft, BS +, not tender, no hepatosplenomegaly  : no nguyen  Musculoskeletal : no joint swelling, no edema  Skin:  some erythema around R eye  Neurologic:     no focal deficits  psych: normal affect      Drug Dosing Weight  Height (cm): 180.34 (24 Jan 2020 17:00)  Weight (kg): 83.9 (24 Jan 2020 17:00)  BMI (kg/m2): 25.8 (24 Jan 2020 17:00)  BSA (m2): 2.04 (24 Jan 2020 17:00)    Vital Signs Last 24 Hrs  T(F): 98.1 (01-25-20 @ 05:19), Max: 98.6 (01-22-20 @ 14:08)    Vital Signs Last 24 Hrs  HR: 72 (01-25-20 @ 05:19) (72 - 77)  BP: 114/75 (01-25-20 @ 05:19) (114/75 - 140/82)  RR: 18 (01-25-20 @ 05:19)  SpO2: 96% (01-25-20 @ 05:19) (96% - 97%)  Wt(kg): --                          15.7   5.26  )-----------( 313      ( 25 Jan 2020 06:57 )             47.0       01-25    136  |  99  |  17  ----------------------------<  95  4.1   |  25  |  1.22    Ca    9.6      25 Jan 2020 06:55  Phos  3.4     01-25  Mg     2.0     01-25    TPro  6.9  /  Alb  4.2  /  TBili  1.2  /  DBili  x   /  AST  19  /  ALT  18  /  AlkPhos  57  01-25          MICROBIOLOGY:        RADIOLOGY:    CT Orbit 1/24/20- Right preseptal, periorbital soft tissue swelling and subcutaneous inflammatory change, slightly worsened, suggestive of a preseptal orbital cellulitis. No evidence of a post septal orbital cellulitis. HPI: 54 M hx of EBV infection, p/w progressive R eye pain. On 1/21 noticed injected R sclera. Went to [facility] and given doxycycline and polymyxin eye gtt. Saw his optometrist 1/22 and sent to [place] ED for CT orbit which showed preseptal cellulitis. D/C on doxycycline and went to ophthalmologist on Friday who sent him back to ED. Repeat CT Orbit shows no abscess and no post-septal involvement. Pt thinks overall his eye pain and swelling has improved.    Had PCN allergy when 2 yo w hives. Doesn't know if he has taken beta-lactams since then. Has dog at home- dog is fine. Family has been sick w URI these past couple weeks. He had a cough and some sinus congestion that started 2 weeks ago- overall improved. Had deep teeth cleaning 2-3 weeks ago; no oral pain. No fevers or chills.    PAST MEDICAL & SURGICAL HISTORY:  Chronic James Barr virus (EBV) infection  S/P appendectomy      Allergies    penicillin (Rash)    Intolerances        ANTIMICROBIALS:  cefTRIAXone   IVPB    cefTRIAXone   IVPB 1000 once  vancomycin  IVPB 1000 every 12 hours      OTHER MEDS:  acetaminophen   Tablet .. 650 milliGRAM(s) Oral every 6 hours PRN  erythromycin   Ointment 1 Application(s) Right EYE at bedtime      SOCIAL HISTORY:  Substance Use (street drugs): denies  Tobacco Usage:  denies  Alcohol Usage: denies    FAMILY HISTORY:  No pertinent family history in first degree relatives      ROS:  All other systems negative     Constitutional: no fever, no chill  Eye: R eye pain, R eye redness, no vision changes  ENT:  no sore throat, no rhinorrhea  Cardiovascular:  no chest pain, no palpitation  Respiratory:  no SOB, improving cough  GI:  no abd pain, no vomiting, no diarrhea  urinary: no dysuria  musculoskeletal:  no joint pain, no joint swelling  skin:  no rash  neurology:  no headache     Physical Exam:    General: NAD, non toxic  Head: atraumatic, normocephalic  Eyes: R eye erythematous w some surrounding edema  ENT:   no oropharyngeal lesions, no LAD  Cardio:    regular S1,S2  Respiratory:   clear b/l, no wheezing  abd:   soft, BS +, not tender, no hepatosplenomegaly  : no nguyen  Musculoskeletal : no joint swelling, no edema  Skin:  some erythema around R eye  Neurologic:     no focal deficits  psych: normal affect      Drug Dosing Weight  Height (cm): 180.34 (24 Jan 2020 17:00)  Weight (kg): 83.9 (24 Jan 2020 17:00)  BMI (kg/m2): 25.8 (24 Jan 2020 17:00)  BSA (m2): 2.04 (24 Jan 2020 17:00)    Vital Signs Last 24 Hrs  T(F): 98.1 (01-25-20 @ 05:19), Max: 98.6 (01-22-20 @ 14:08)    Vital Signs Last 24 Hrs  HR: 72 (01-25-20 @ 05:19) (72 - 77)  BP: 114/75 (01-25-20 @ 05:19) (114/75 - 140/82)  RR: 18 (01-25-20 @ 05:19)  SpO2: 96% (01-25-20 @ 05:19) (96% - 97%)  Wt(kg): --                          15.7   5.26  )-----------( 313      ( 25 Jan 2020 06:57 )             47.0       01-25    136  |  99  |  17  ----------------------------<  95  4.1   |  25  |  1.22    Ca    9.6      25 Jan 2020 06:55  Phos  3.4     01-25  Mg     2.0     01-25    TPro  6.9  /  Alb  4.2  /  TBili  1.2  /  DBili  x   /  AST  19  /  ALT  18  /  AlkPhos  57  01-25          MICROBIOLOGY:        RADIOLOGY:    CT Orbit 1/24/20- Right preseptal, periorbital soft tissue swelling and subcutaneous inflammatory change, slightly worsened, suggestive of a preseptal orbital cellulitis. No evidence of a post septal orbital cellulitis. HPI: 54 M hx of EBV infection, p/w progressive R eye pain. On 1/21 noticed injected R sclera. Went to [facility] and given doxycycline and polymyxin eye gtt. Saw his optometrist 1/22 and sent to [place] ED for CT orbit which showed preseptal cellulitis. D/C on doxycycline and went to ophthalmologist on Friday who sent him back to ED. Repeat CT Orbit shows no abscess and no post-septal involvement. Pt thinks overall his eye pain and swelling has improved.    Had PCN allergy when 4 yo w hives. Doesn't know if he has taken beta-lactams since then. Has dog at home- dog is fine. Family has been sick w URI these past couple weeks. He had a cough and some sinus congestion that started 2 weeks ago- overall improved. Had deep teeth cleaning 2-3 weeks ago; no oral pain. No fevers or chills.    PAST MEDICAL & SURGICAL HISTORY:  Chronic James Barr virus (EBV) infection  S/P appendectomy      Allergies    penicillin (Rash)    Intolerances        ANTIMICROBIALS:  cefTRIAXone   IVPB    cefTRIAXone   IVPB 1000 once  vancomycin  IVPB 1000 every 12 hours      OTHER MEDS:  acetaminophen   Tablet .. 650 milliGRAM(s) Oral every 6 hours PRN  erythromycin   Ointment 1 Application(s) Right EYE at bedtime      SOCIAL HISTORY:  Substance Use (street drugs): denies  Tobacco Usage:  denies  Alcohol Usage: denies    FAMILY HISTORY:  No pertinent family history in first degree relatives      ROS:  All other systems negative     Constitutional: no fever, no chill  Eye: R eye pain, R eye redness, no vision changes  ENT:  no sore throat, no rhinorrhea  Cardiovascular:  no chest pain, no palpitation  Respiratory:  no SOB, improving cough  GI:  no abd pain, no vomiting, no diarrhea  urinary: no dysuria  musculoskeletal:  no joint pain, no joint swelling  skin:  no rash  neurology:  no headache     Physical Exam:    General: NAD, non toxic  Head: atraumatic, normocephalic  Eyes: R eye erythematous w some surrounding edema  ENT:   no oropharyngeal lesions, no LAD  Cardio:    regular S1,S2  Respiratory:   clear b/l, no wheezing  abd:   soft, BS +, not tender, no hepatosplenomegaly  : no nguyen  Musculoskeletal : no joint swelling, no edema  Skin:  some erythema around R eye  Neurologic:     no focal deficits  psych: normal affect      Drug Dosing Weight  Height (cm): 180.34 (24 Jan 2020 17:00)  Weight (kg): 83.9 (24 Jan 2020 17:00)  BMI (kg/m2): 25.8 (24 Jan 2020 17:00)  BSA (m2): 2.04 (24 Jan 2020 17:00)    Vital Signs Last 24 Hrs  T(F): 98.1 (01-25-20 @ 05:19), Max: 98.6 (01-22-20 @ 14:08)    Vital Signs Last 24 Hrs  HR: 72 (01-25-20 @ 05:19) (72 - 77)  BP: 114/75 (01-25-20 @ 05:19) (114/75 - 140/82)  RR: 18 (01-25-20 @ 05:19)  SpO2: 96% (01-25-20 @ 05:19) (96% - 97%)  Wt(kg): --                          15.7   5.26  )-----------( 313      ( 25 Jan 2020 06:57 )             47.0       01-25    136  |  99  |  17  ----------------------------<  95  4.1   |  25  |  1.22    Ca    9.6      25 Jan 2020 06:55  Phos  3.4     01-25  Mg     2.0     01-25    TPro  6.9  /  Alb  4.2  /  TBili  1.2  /  DBili  x   /  AST  19  /  ALT  18  /  AlkPhos  57  01-25          MICROBIOLOGY:        RADIOLOGY:  Personally reviewed below   CT Orbit 1/24/20- Right preseptal, periorbital soft tissue swelling and subcutaneous inflammatory change, slightly worsened, suggestive of a preseptal orbital cellulitis. No evidence of a post septal orbital cellulitis.

## 2020-01-26 ENCOUNTER — TRANSCRIPTION ENCOUNTER (OUTPATIENT)
Age: 55
End: 2020-01-26

## 2020-01-26 VITALS
SYSTOLIC BLOOD PRESSURE: 126 MMHG | HEART RATE: 68 BPM | TEMPERATURE: 98 F | RESPIRATION RATE: 18 BRPM | OXYGEN SATURATION: 98 % | DIASTOLIC BLOOD PRESSURE: 80 MMHG

## 2020-01-26 LAB — VANCOMYCIN TROUGH SERPL-MCNC: 9.6 UG/ML — LOW (ref 10–20)

## 2020-01-26 PROCEDURE — 85610 PROTHROMBIN TIME: CPT

## 2020-01-26 PROCEDURE — 96374 THER/PROPH/DIAG INJ IV PUSH: CPT | Mod: XU

## 2020-01-26 PROCEDURE — 85027 COMPLETE CBC AUTOMATED: CPT

## 2020-01-26 PROCEDURE — 99232 SBSQ HOSP IP/OBS MODERATE 35: CPT

## 2020-01-26 PROCEDURE — 84100 ASSAY OF PHOSPHORUS: CPT

## 2020-01-26 PROCEDURE — 70481 CT ORBIT/EAR/FOSSA W/DYE: CPT

## 2020-01-26 PROCEDURE — 99239 HOSP IP/OBS DSCHRG MGMT >30: CPT | Mod: GC

## 2020-01-26 PROCEDURE — 87040 BLOOD CULTURE FOR BACTERIA: CPT

## 2020-01-26 PROCEDURE — 80053 COMPREHEN METABOLIC PANEL: CPT

## 2020-01-26 PROCEDURE — 93005 ELECTROCARDIOGRAM TRACING: CPT

## 2020-01-26 PROCEDURE — 80202 ASSAY OF VANCOMYCIN: CPT

## 2020-01-26 PROCEDURE — 85730 THROMBOPLASTIN TIME PARTIAL: CPT

## 2020-01-26 PROCEDURE — 99285 EMERGENCY DEPT VISIT HI MDM: CPT | Mod: 25

## 2020-01-26 PROCEDURE — 83735 ASSAY OF MAGNESIUM: CPT

## 2020-01-26 RX ORDER — ERYTHROMYCIN BASE 5 MG/GRAM
1 OINTMENT (GRAM) OPHTHALMIC (EYE)
Qty: 1 | Refills: 0
Start: 2020-01-26 | End: 2020-02-01

## 2020-01-26 RX ORDER — CEFPODOXIME PROXETIL 100 MG
2 TABLET ORAL
Qty: 24 | Refills: 0
Start: 2020-01-26 | End: 2020-01-31

## 2020-01-26 RX ORDER — PREDNISOLONE SODIUM PHOSPHATE 1 %
1 DROPS OPHTHALMIC (EYE)
Qty: 1 | Refills: 0
Start: 2020-01-26 | End: 2020-02-01

## 2020-01-26 RX ORDER — AZTREONAM 2 G
1 VIAL (EA) INJECTION
Qty: 12 | Refills: 0
Start: 2020-01-26 | End: 2020-01-31

## 2020-01-26 RX ADMIN — Medication 250 MILLIGRAM(S): at 06:04

## 2020-01-26 RX ADMIN — CEFTRIAXONE 100 MILLIGRAM(S): 500 INJECTION, POWDER, FOR SOLUTION INTRAMUSCULAR; INTRAVENOUS at 10:19

## 2020-01-26 RX ADMIN — Medication 1 DROP(S): at 06:04

## 2020-01-26 RX ADMIN — Medication 1 DROP(S): at 06:03

## 2020-01-26 RX ADMIN — Medication 1 DROP(S): at 11:41

## 2020-01-26 NOTE — PROGRESS NOTE ADULT - ATTENDING COMMENTS
Appreciate ID and optho input. Cleared from both. For vantin/bactrim for pre-septal cellulitis. For pred-forte and erythromycin ointment from optho.  dc planning 36 minutes with optho follow up tomorrow at 9AM.
agree with above    #Pre-septal cellulitis  -IV Abx for now, possible transition to po Abx next 24- 48housr.  -trial of ceftriaxone given possible PCN allergy when was 3    #?episcleritis  -discussed personally with optho, possible episcleritis, possible viral conjunctivitis  -awaiting swab  -continue eye drops    Dispo pending optho clearance.

## 2020-01-26 NOTE — PROGRESS NOTE ADULT - ASSESSMENT
54M w/ self-reported PMHx chronic EBV infection, p/w progressive R eye pain/watering/pain x 4 days; outpatient CT significant for periorbital swelling/preseptal cellulitis without orbital cellulitis. Much improved, tentative discharge today, to complete 10-day course of Abx, f/u with Ophtho and ID outpatient.

## 2020-01-26 NOTE — DISCHARGE NOTE PROVIDER - NSDCMRMEDTOKEN_GEN_ALL_CORE_FT
Bactrim  mg-160 mg oral tablet: 1 tab(s) orally 2 times a day   cefpodoxime 200 mg oral tablet: 2 tab(s) orally 2 times a day   erythromycin 0.5% ophthalmic ointment: 1 application to each affected eye once a day (at bedtime)  prednisoLONE acetate 1% ophthalmic suspension: 1 drop(s) to each affected eye 4 times a day

## 2020-01-26 NOTE — DISCHARGE NOTE PROVIDER - NSDCCPTREATMENT_GEN_ALL_CORE_FT
PRINCIPAL PROCEDURE  Procedure: CT orbit w con  Findings and Treatment: IMPRESSION:  Right preseptal, periorbital soft tissue swelling and subcutaneous inflammatory change, slightly worsened, suggestive of a preseptal orbital cellulitis. No evidence of a post septal orbital cellulitis.      SECONDARY PROCEDURE  Procedure: CT orbit w con  Findings and Treatment: IMPRESSION:   Right periorbital soft tissue swelling suggesting cellulitis. No evidence of orbital cellulitis.

## 2020-01-26 NOTE — DISCHARGE NOTE PROVIDER - PROVIDER TOKENS
FREE:[LAST:[Clinic],FIRST:[Ophthalmology],PHONE:[phone]],FAX:[phone]    -],ADDRESS:[[address] [place] [address]],FOLLOWUP:[1-3 days]],PROVIDER:[TOKEN:[[id number]],FOLLOWUP:[1 week]]

## 2020-01-26 NOTE — DISCHARGE NOTE NURSING/CASE MANAGEMENT/SOCIAL WORK - PATIENT PORTAL LINK FT
You can access the FollowMyHealth Patient Portal offered by [facility] by registering at the following website: [email or web address]. By joining [facility]’s FollowMyHealth portal, you will also be able to view your health information using other applications (apps) compatible with our system.

## 2020-01-26 NOTE — DISCHARGE NOTE PROVIDER - HOSPITAL COURSE
History of Present Illness    54 M hx of EBV infection, p/w progressive R eye pain. Sx started 1/21/20 while at work.  Noticed redness like blood in eye. +pain, worsened with movement, +eye swelling and tearing. Pt doesn't recall foreign body or bite. Never had previous episode like this.  Pt notes that about 1-2 wks ago felt a flare of his EBV with bronchitis like sx, thinks current eye sx may be related.  Also notes +sick contacts (wife/children all sick with flu like illness); pt denies other URI sx or myalgias. Denies cp, sob, f/c, +mild intermittent nausea no v/d, denies dysuria. Pt was seen at urgent care center 1/21/20, started on oral doxycycline. Sx not really changing, so he was referred to [place] [facility] 1/22/20 for CT scan of orbits that showed periorbital cellulitis without evidence of orbital cellulitis.  Was sent home, and then followed with Dr. Brendan Gagnon (oculoplastic surgery) today and was sent in for evaluation of orbital cellulitis as his exam appears to have worsened despite oral antibiotics.     Emergency Department Course    VS: 98.2, 77, 131/78, 16, 97% RA.  Labs: cbc, coags, cmp all unrevealing. Repeat CT orbits showed +R periorbital soft tissue swelling suggesting cellulitis possibly a bit worsened but without evidence of orbital cellulitis. +mucosal thickening of b/l frontal/ethmoid/maxillary sinuses. In ER pt received IV vancomycin and IVF prior to medicine team involvement.        Hospital Course    Patient admitted to the medicine floor. History of Present Illness    54 M hx of EBV infection, p/w progressive R eye pain. Sx started 1/21/20 while at work.  Noticed redness like blood in eye. +pain, worsened with movement, +eye swelling and tearing. Pt doesn't recall foreign body or bite. Never had previous episode like this.  Pt notes that about 1-2 wks ago felt a flare of his EBV with bronchitis like sx, thinks current eye sx may be related.  Also notes +sick contacts (wife/children all sick with flu like illness); pt denies other URI sx or myalgias. Denies cp, sob, f/c, +mild intermittent nausea no v/d, denies dysuria. Pt was seen at urgent care center 1/21/20, started on oral doxycycline. Sx not really changing, so he was referred to [place] [facility] 1/22/20 for CT scan of orbits that showed periorbital cellulitis without evidence of orbital cellulitis.  Was sent home, and then followed with Dr. Brendan Gagnon (oculoplastic surgery) today and was sent in for evaluation of orbital cellulitis as his exam appears to have worsened despite oral antibiotics.     Emergency Department Course    VS: 98.2, 77, 131/78, 16, 97% RA.  Labs: cbc, coags, cmp all unrevealing. Repeat CT orbits showed +R periorbital soft tissue swelling suggesting cellulitis possibly a bit worsened but without evidence of orbital cellulitis. +mucosal thickening of b/l frontal/ethmoid/maxillary sinuses. In ER, patient received IV vancomycin and IVF prior to medicine team involvement.        Hospital Course    Patient admitted to the medicine floor. Ophthalmology consulted; upon evaluation, pathology assessed to be limited to the globe, likely a preseptal cellulitis/episcleritis/severe conjunctivitis. A swab of eye fluid was negative for viral pathogens. They recommended erythromycin ointment, ceftriaxone, and vancomycin. Patient noted to have a penicillin allergy in childhood and was apprehensive to trying cephalosporin; he instead received levofloxacin and metronidazole. Infectious Disease was consulted; patient was amenable to try cephalosporin so he was transitioned to ceftriaxone, after which levaquin and flagyl were discontinued. Patient's was noted to have significantly improved and cleared for discharge with follow-up with ID and Ophthalmology.        On the date of discharge, patient was evaluated and determined to be clinically stable; he was assessed to be an appropriate candidate for discharge. History of Present Illness    54 M hx of EBV infection, p/w progressive R eye pain. Sx started 1/21/20 while at work.  Noticed redness like blood in eye. +pain, worsened with movement, +eye swelling and tearing. Pt doesn't recall foreign body or bite. Never had previous episode like this.  Pt notes that about 1-2 wks ago felt a flare of his EBV with bronchitis like sx, thinks current eye sx may be related.  Also notes +sick contacts (wife/children all sick with flu like illness); pt denies other URI sx or myalgias. Denies cp, sob, f/c, +mild intermittent nausea no v/d, denies dysuria. Pt was seen at urgent care center 1/21/20, started on oral doxycycline. Sx not really changing, so he was referred to [place] [facility] 1/22/20 for CT scan of orbits that showed periorbital cellulitis without evidence of orbital cellulitis. He was sent home, and then followed with Dr. Brendan Gagnon (oculoplastic surgery) today and was sent in for evaluation of orbital cellulitis as his exam appears to have worsened despite oral antibiotics.         Emergency Department Course    VS: 98.2, 77, 131/78, 16, 97% RA.  Labs: WNL. Repeat CT orbits showed +R periorbital soft tissue swelling suggesting cellulitis possibly a bit worsened but without evidence of orbital cellulitis. +mucosal thickening of b/l frontal/ethmoid/maxillary sinuses. In ER, patient received IV vancomycin and IVF prior to medicine team involvement.        Hospital Course    Patient admitted to the medicine floor. Ophthalmology consulted; upon evaluation, pathology assessed to be limited to the globe, likely a preseptal cellulitis/episcleritis/severe conjunctivitis. A swab of eye fluid was negative for viral pathogens. They recommended erythromycin ointment, ceftriaxone, and vancomycin. Patient noted to have a penicillin allergy in childhood and was apprehensive to trying cephalosporin; he instead received levofloxacin and metronidazole. Infectious Disease was consulted; patient was amenable to try cephalosporin so he was transitioned to ceftriaxone, after which levaquin and flagyl were discontinued. Patient's was noted to have significantly improved and cleared for discharge with follow-up with ID and Ophthalmology.        On the date of discharge, patient was evaluated and determined to be clinically stable; he was assessed to be an appropriate candidate for discharge.

## 2020-01-26 NOTE — PROGRESS NOTE ADULT - SUBJECTIVE AND OBJECTIVE BOX
Contact Information:  Wade Barillas II, MD, MPH  PGY-1, Internal Medicine  Pager: [phone] ([facility]) /// [phone] ([facility]    TEENA NAJERA, [id number]    Patient is a 54y old  Male who presents with a chief complaint of preseptal cellulitis refractory to oral antibiotics (25 Jan 2020 11:36)      OVERNIGHT EVENTS: No overnight events.    SUBJECTIVE: Patient evaluated at bedside, states that R eye feels sticky due to ointment and eye drops but otherwise much improved. No other complaints or concerns.      EYES: +Resolving R eye erythema and swelling. No R eye pain.      OBJECTIVE:  Vital Signs Last 24 Hrs  T(C): 36.5 (26 Jan 2020 04:58), Max: 36.8 (25 Jan 2020 21:24)  T(F): 97.7 (26 Jan 2020 04:58), Max: 98.2 (25 Jan 2020 21:24)  HR: 65 (26 Jan 2020 04:58) (65 - 83)  BP: 111/73 (26 Jan 2020 04:58) (102/64 - 113/78)  BP(mean): --  RR: 18 (26 Jan 2020 04:58) (18 - 18)  SpO2: 97% (26 Jan 2020 04:58) (96% - 98%)  I&O's Summary    25 Jan 2020 07:01  -  26 Jan 2020 07:00  --------------------------------------------------------  IN: 1210 mL / OUT: 0 mL / NET: 1210 mL        MEDICATIONS  (STANDING):  artificial tears (preservative free) Ophthalmic Solution 1 Drop(s) Both EYES two times a day  cefTRIAXone   IVPB      cefTRIAXone   IVPB 1000 milliGRAM(s) IV Intermittent every 24 hours  erythromycin   Ointment 1 Application(s) Right EYE at bedtime  prednisoLONE acetate 1% Suspension 1 Drop(s) Right EYE four times a day  vancomycin  IVPB 1000 milliGRAM(s) IV Intermittent every 12 hours    MEDICATIONS  (PRN):  acetaminophen   Tablet .. 650 milliGRAM(s) Oral every 6 hours PRN Temp greater or equal to 38C (100.4F), Mild Pain (1 - 3), Moderate Pain (4 - 6), Severe Pain (7 - 10)  guaiFENesin   Syrup  (Sugar-Free) 200 milliGRAM(s) Oral every 6 hours PRN Cough    Allergies    penicillin (Rash)    Intolerances        CONSTITUTIONAL: Sitting upright in chair, no acute distress.  EYES: Resolving chemosis and erythema in R eye. +PERRL. +EOMI without pain. No tenderness to palpation of R eyelids. L eye WNL.  RESPIRATORY: CTAB. No wheezes, rales, or rhonchi.   CARDIOVASCULAR: +S1/S2. Regular rate and rhythm.   GASTROINTESTINAL: Soft, nontender, nondistended. +BS.   MUSCULOSKELETAL: Spontaneous movement.  NEUROLOGICAL: Nonfocal.                            15.7   5.26  )-----------( 313      ( 25 Jan 2020 06:57 )             47.0     PT/INR - ( 24 Jan 2020 20:48 )   PT: 12.7 sec;   INR: 1.10 ratio         PTT - ( 24 Jan 2020 20:48 )  PTT:33.9 sec  01-25    136  |  99  |  17  ----------------------------<  95  4.1   |  25  |  1.22    Ca    9.6      25 Jan 2020 06:55  Phos  3.4     01-25  Mg     2.0     01-25    TPro  6.9  /  Alb  4.2  /  TBili  1.2  /  DBili  x   /  AST  19  /  ALT  18  /  AlkPhos  57  01-25    CAPILLARY BLOOD GLUCOSE        LIVER FUNCTIONS - ( 25 Jan 2020 06:55 )  Alb: 4.2 g/dL / Pro: 6.9 g/dL / ALK PHOS: 57 U/L / ALT: 18 U/L / AST: 19 U/L / GGT: x                   Culture - Blood (collected 24 Jan 2020 23:08)  Source: .Blood Blood-Peripheral  Preliminary Report (26 Jan 2020 01:02):    No growth to date.    Culture - Blood (collected 24 Jan 2020 23:04)  Source: .Blood Blood-Peripheral  Preliminary Report (26 Jan 2020 01:02):    No growth to date.          RADIOLOGY AND ADDITIONAL TESTS:    CONSULTANT NOTES REVIEWED:    CARE DISCUSSED WITH THE FOLLOWING CONSULTANTS/PROVIDERS:

## 2020-01-26 NOTE — PROGRESS NOTE ADULT - SUBJECTIVE AND OBJECTIVE BOX
Follow Up:  Preseptal cellulitis     Interval History/ROS: Feels much better today. No fevers.     Allergies  penicillin (Rash)    ANTIMICROBIALS:  vancomycin  IVPB 1000 every 12 hours      OTHER MEDS:  acetaminophen   Tablet .. 650 milliGRAM(s) Oral every 6 hours PRN  artificial tears (preservative free) Ophthalmic Solution 1 Drop(s) Both EYES two times a day  erythromycin   Ointment 1 Application(s) Right EYE at bedtime  guaiFENesin   Syrup  (Sugar-Free) 200 milliGRAM(s) Oral every 6 hours PRN  prednisoLONE acetate 1% Suspension 1 Drop(s) Right EYE four times a day      Vital Signs Last 24 Hrs  T(C): 36.7 (26 Jan 2020 13:57), Max: 36.8 (25 Jan 2020 21:24)  T(F): 98.1 (26 Jan 2020 13:57), Max: 98.2 (25 Jan 2020 21:24)  HR: 68 (26 Jan 2020 13:57) (65 - 83)  BP: 126/80 (26 Jan 2020 13:57) (102/64 - 126/80)  BP(mean): --  RR: 18 (26 Jan 2020 13:57) (18 - 18)  SpO2: 98% (26 Jan 2020 13:57) (96% - 98%)    Physical Exam:  General: NAD, non toxic  Head: atraumatic, normocephalic  Eye: normal sclera and conjunctiva  ENT: no oropharyngeal lesions, no cervical lymphadenopathy   Cardio: regular rate and rhythm   Respiratory: nonlabored on room air, clear bilaterally, no wheezing  abd: soft, bowel sounds present, no tenderness  : no CVAT, no suprapubic tenderness, no  nguyen  Musculoskeletal: no joint swelling, no edema  vascular: no phlebitis   Skin: no rash  Neurologic: no focal deficit  psych: normal affect                          15.7   5.26  )-----------( 313      ( 25 Jan 2020 06:57 )             47.0       01-25    136  |  99  |  17  ----------------------------<  95  4.1   |  25  |  1.22    Ca    9.6      25 Jan 2020 06:55  Phos  3.4     01-25  Mg     2.0     01-25    TPro  6.9  /  Alb  4.2  /  TBili  1.2  /  DBili  x   /  AST  19  /  ALT  18  /  AlkPhos  57  01-25          MICROBIOLOGY:  Vancomycin Level, Trough: 9.6 ug/mL (01-26-20 @ 05:04)  v          RADIOLOGY:  Images below reviewed personally Follow Up:  Preseptal cellulitis     Interval History/ROS: Feels much better today. No fevers.     Allergies  penicillin (Rash)    ANTIMICROBIALS:  vancomycin  IVPB 1000 every 12 hours      OTHER MEDS:  acetaminophen   Tablet .. 650 milliGRAM(s) Oral every 6 hours PRN  artificial tears (preservative free) Ophthalmic Solution 1 Drop(s) Both EYES two times a day  erythromycin   Ointment 1 Application(s) Right EYE at bedtime  guaiFENesin   Syrup  (Sugar-Free) 200 milliGRAM(s) Oral every 6 hours PRN  prednisoLONE acetate 1% Suspension 1 Drop(s) Right EYE four times a day      Vital Signs Last 24 Hrs  T(C): 36.7 (26 Jan 2020 13:57), Max: 36.8 (25 Jan 2020 21:24)  T(F): 98.1 (26 Jan 2020 13:57), Max: 98.2 (25 Jan 2020 21:24)  HR: 68 (26 Jan 2020 13:57) (65 - 83)  BP: 126/80 (26 Jan 2020 13:57) (102/64 - 126/80)  BP(mean): --  RR: 18 (26 Jan 2020 13:57) (18 - 18)  SpO2: 98% (26 Jan 2020 13:57) (96% - 98%)    Physical Exam:  General: NAD, non toxic  Head: atraumatic, normocephalic  Eye: right eye conjunctiva swollen/edematous, no discharge, erythema and swelling look a bit better   Cardio: regular rate and rhythm   Respiratory: nonlabored on room air, clear bilaterally, no wheezing  abd: soft, bowel sounds present, no tenderness  Skin: no rash  Neurologic: no focal deficit  psych: normal affect                          15.7   5.26  )-----------( 313      ( 25 Jan 2020 06:57 )             47.0       01-25    136  |  99  |  17  ----------------------------<  95  4.1   |  25  |  1.22    Ca    9.6      25 Jan 2020 06:55  Phos  3.4     01-25  Mg     2.0     01-25    TPro  6.9  /  Alb  4.2  /  TBili  1.2  /  DBili  x   /  AST  19  /  ALT  18  /  AlkPhos  57  01-25      MICROBIOLOGY:  Vancomycin Level, Trough: 9.6 ug/mL (01-26-20 @ 05:04)    Culture - Blood (collected 01-24-20 @ 23:08)  Source: .Blood Blood-Peripheral  Preliminary Report (01-26-20 @ 01:02):    No growth to date.    Culture - Blood (collected 01-24-20 @ 23:04)  Source: .Blood Blood-Peripheral  Preliminary Report (01-26-20 @ 01:02):    No growth to date.    RADIOLOGY:  Images below reviewed personally  CT Orbit w/ IV Cont (01.24.20 @ 21:43)   Right preseptal, periorbital soft tissue swelling and subcutaneous inflammatory change, slightly worsened, suggestive of a preseptal orbital cellulitis. No evidence of a post septal orbital cellulitis.

## 2020-01-26 NOTE — PROGRESS NOTE ADULT - PROBLEM SELECTOR PLAN 1
- Much improved  - Tentative dc today, to complete 10-day course of Abx  - F/u with Ophtho and ID outpatient  - R eye redness, swelling, tearing, lacrimation; etiology unclear.  - Ddx: preseptal cellulitis vs conjunctivitis  - Interval repeat CT orbits confirms slightly worsened preseptal cellulitis WITHOUT evidence of orbital cellulitis  - Patient notes PCN allergy as a child (hives/rash); made aware of minimal risk of cross-reactivity w/cephalosporins, amenable to trying ceftriaxone  - Optho eval appreciated, process confined to globe; c/w abx + Pred Forte eye drops  - ID consult - c/w vancomycin and ceftriaxone. Monitor for reaction to cephalosporin

## 2020-01-26 NOTE — DISCHARGE NOTE NURSING/CASE MANAGEMENT/SOCIAL WORK - NSDCPEPT PROEDMA_GEN_ALL_CORE
Patient becoming nauseated and vomited bile x1. Abd more distended and now semi firm compared to noon assessment. Patient also still has not urinated after 3 L fluid boluses since 0330. Spoke with Dr. Henry Marti re: increased abd distention, nausea/vomiting. Order to replace NG tube and set to LIS. Spoke with Dr. Mikayla Nicholson re: No urination since IVF boluses and the need to replace NG tube. Order for 1 L LR bolus at this time. Dr. Mikayla Nicholson to come to bedside to replace NG tube. Yes

## 2020-01-26 NOTE — PROGRESS NOTE ADULT - ASSESSMENT
Bactrim DS BID and Vantin 400mg PO BID to complete a 7 day course     Kavon Espinosa MD   Infectious Disease   Pager [phone]   After 5PM and on weekends please page fellow on call or call [phone] Possible viral conjunctivitis with secondary preseptal cellulitis  Improved  Discharge on Bactrim 1DS BID and Vantin 400mg BID to complete a 7 day course   He will see ophthalmology tomorrow and he has my contact information     Spoke with primary team     Kavon Espinosa MD   Infectious Disease   Pager [phone]   After 5PM and on weekends please page fellow on call or call [phone]

## 2020-01-26 NOTE — DISCHARGE NOTE PROVIDER - CARE PROVIDER_API CALL
Clinic, Ophthalmology  [address] [place] [address].  [place] [address]  Phone: [phone]  Fax: (   )    -  Follow Up Time: 1-3 days    Larissa Green)  Family Medicine  [address] [place] [id number]  Phone: [phone]  Fax: [phone]  Follow Up Time: 1 week

## 2020-01-26 NOTE — DISCHARGE NOTE PROVIDER - NSDCCPCAREPLAN_GEN_ALL_CORE_FT
PRINCIPAL DISCHARGE DIAGNOSIS  Diagnosis: Preseptal cellulitis of right eye  Assessment and Plan of Treatment: Preseptal cellulitis of right eye PRINCIPAL DISCHARGE DIAGNOSIS  Diagnosis: Preseptal cellulitis of right eye  Assessment and Plan of Treatment: You were admitted because of R eye re PRINCIPAL DISCHARGE DIAGNOSIS  Diagnosis: Preseptal cellulitis of right eye  Assessment and Plan of Treatment: You were admitted because of R eye redness, swelling, and pain. You had a CT of your eye which confirmed swelling around your R eyeball. Ophthalmology was consulted and suggested that you had preseptal cellulitis. Ophthalmology and Infectious Disease recommended antibiotics, which you received. Ophthalmology also recommended topical ointment and steroids. During your hospital stay, your eye symptoms were markedly improved. You are being discharged on antibiotics and topical ointment and steroids for your eye. You are to follow-up with your primary care doctor and Ophthalmology to ensure resolution of your symptoms.  Clinic, Ophthalmology  [address] [place] [id number]  Phone: [phone]  Fax: (   )    -  Follow Up Time: 1-3 days  Larissa Green)  Family Medicine  [address] [place] [id number]  Phone: [phone]  Fax: [phone]  Follow Up Time: 1 week

## 2020-01-26 NOTE — PROGRESS NOTE ADULT - PROBLEM SELECTOR PLAN 2
- DVT: IMPROVE score 0 - no pharmacological prophylaxis  - Dispo: Pending, likely home  - Diet: Regular

## 2020-01-26 NOTE — PROGRESS NOTE ADULT - REASON FOR ADMISSION
preseptal cellulitis refractory to oral antibiotics

## 2020-01-26 NOTE — DISCHARGE NOTE PROVIDER - NSDCFUADDAPPT_GEN_ALL_CORE_FT
Clinic, Ophthalmology  [address] [place] [address].  [place] [address]  Phone: [phone]  Fax: (   )    -  Follow Up Time: 1-3 days    Larissa Green)  Family Medicine  [address] [place] [id number]  Phone: [phone]  Fax: [phone]  Follow Up Time: 1 week

## 2020-01-26 NOTE — PROGRESS NOTE ADULT - SUBJECTIVE AND OBJECTIVE BOX
[facility] Ophthalmology Follow Up Note    Interval:   Pt states his pain with EOM in the right eye is significantly improved, persistent swelling, but feels that it is a bit better       Mood and Affect Appropriate ( x ),  Oriented to Time, Place, and Person x 3 ( x )    Ophthalmology Exam    Visual acuity (sc): OD: 20/40 pinhole improves to 20/25 , OS: 20/40 pinhole improves to 20/25   Pupils: PERRL OU, no APD  Ttono: 20 OD, 18 OS   Extraocular movements (EOMs): Full OU, very minimal pain with EOM     Slit Lamp Exam (SLE)  External:  OD: tender to palpation over the periorbit, upper and lower eyelids, and right maxilla, which appears edematous Flat OS, no resistance to retropulsion OU   Lids/Lashes/Lacrimal Ducts: Flat OU    Sclera/Conjunctiva:  OD: +3 chemosis of the bulbar conjunctiva, +2 injection diffuse of the conjunctiva, W+Q OS. OD: due to chemosis of the bulbar conjunctiva, the lid cannot completely close, leaving protruding shelf of bulbar conjunctiva, some some punctate hemorrhage in the bulbar and palpebral conjunctiva,   Cornea: Cl OU, has circumferential keratotomy incisions OU   Anterior Chamber: D+Q OU   Iris:  Flat OU  Lens:  Cl OU    B-scan OD 1/25/20: no masses, detachments or thickening of the sclera demonstrated     Assessment and Plan:   55 yo M dx with right sided preseptal cellulitis on 1/21/22, put on on PO Doxycycline,, and presented on 1/24/20 from private ophthalmologist's office (Dr Brendan Gagnon of [facility] ([phone]), , with progression of right eye injection and chemosis, pain with EOM OD for concern of orbital cellulitis of the right eye. Initial evaluation included Orbit CT orbit on 1/24/20 which demonstrated right pre-septal and periorbital soft tissue swelling and inflammatory changes, suggestive of preseptal cellulitis. In the setting of edema and chemosis largely confined to the globe itself, with negative imaging, this seems more likely a bulbar process such as severe viral conjunctivitis.     - No need for continued admission from our perspective, will discharge pt on Topical steroid and antibiotic ointment as below   - Pt afebrile, WBC wnl,   - recommend generous Erythromycin ointment BID over the right eyelid margin and over the protruding injected conjunctiva to prevent breakdown of conjunctiva .   - recommend Pred Forte eye drops QID OD   - will follow closely   - business card and appt for follow up tomorrow morning given to patient, pt has appt at 9 am tomorrow at the clinic below:   - DW Dr Sherri Wen (attending), in agreement with plan       Follow-Up:  Patient should follow up with his/her ophthalmologist or with [facility] Ophthalmology within 1 week of after discharge.  [address] [place] [address] [phone]

## 2020-01-27 ENCOUNTER — APPOINTMENT (OUTPATIENT)
Dept: OPHTHALMOLOGY | Facility: CLINIC | Age: 55
End: 2020-01-27

## 2020-01-27 PROBLEM — B27.90 INFECTIOUS MONONUCLEOSIS, UNSPECIFIED WITHOUT COMPLICATION: Chronic | Status: ACTIVE | Noted: 2020-01-24

## 2020-01-29 ENCOUNTER — MOBILE ON CALL (OUTPATIENT)
Age: 55
End: 2020-01-29

## 2020-01-30 ENCOUNTER — APPOINTMENT (OUTPATIENT)
Dept: INFECTIOUS DISEASE | Facility: CLINIC | Age: 55
End: 2020-01-30
Payer: COMMERCIAL

## 2020-01-30 ENCOUNTER — APPOINTMENT (OUTPATIENT)
Dept: OPHTHALMOLOGY | Facility: CLINIC | Age: 55
End: 2020-01-30

## 2020-01-30 VITALS
WEIGHT: 192 LBS | DIASTOLIC BLOOD PRESSURE: 76 MMHG | SYSTOLIC BLOOD PRESSURE: 129 MMHG | TEMPERATURE: 98.3 F | OXYGEN SATURATION: 97 % | RESPIRATION RATE: 18 BRPM | BODY MASS INDEX: 26.88 KG/M2 | HEART RATE: 78 BPM | HEIGHT: 71 IN

## 2020-01-30 DIAGNOSIS — L50.9 URTICARIA, UNSPECIFIED: ICD-10-CM

## 2020-01-30 LAB
CULTURE RESULTS: SIGNIFICANT CHANGE UP
CULTURE RESULTS: SIGNIFICANT CHANGE UP
SPECIMEN SOURCE: SIGNIFICANT CHANGE UP
SPECIMEN SOURCE: SIGNIFICANT CHANGE UP

## 2020-01-30 PROCEDURE — 99214 OFFICE O/P EST MOD 30 MIN: CPT

## 2020-01-30 RX ORDER — CETIRIZINE HYDROCHLORIDE 10 MG/1
10 TABLET, COATED ORAL DAILY
Qty: 10 | Refills: 1 | Status: ACTIVE | COMMUNITY
Start: 2020-01-30 | End: 1900-01-01

## 2020-01-30 NOTE — ASSESSMENT
[FreeTextEntry1] : 54M recently admitted with conjunctivitis (likely viral) with secondary pre-septal cellulitis. \par Penicillin allergy (hives age 3) \par Discharged on Vantin and Bactrim and developed diffuse hives after a few days. \par Stopped antibiotics yesterday. I don't think this was primarily a bacterial process and there was never much clinical bacterial cellulitis on my exam inpatient. Today his right eye looks much much better and he's gotten enough antibiotics at this point. \par -Cetirizine 10mg daily or BID if needed sent to his pharmacy \par -Allergy referral, he has one he wants to see, doesn't need a referral \par -f/u as needed

## 2020-01-30 NOTE — PHYSICAL EXAM
[General Appearance - Alert] : alert [General Appearance - In No Acute Distress] : in no acute distress [Outer Ear] : the ears and nose were normal in appearance [Neck Appearance] : the appearance of the neck was normal [Oropharynx] : the oropharynx was normal with no thrush [Respiration, Rhythm And Depth] : normal respiratory rhythm and effort [] : no respiratory distress [Auscultation Breath Sounds / Voice Sounds] : lungs were clear to auscultation bilaterally [Heart Sounds] : normal S1 and S2 [Murmurs] : no murmurs [Heart Rate And Rhythm] : heart rate was normal and rhythm regular [Bowel Sounds] : normal bowel sounds [Abdomen Soft] : soft [No Focal Deficits] : no focal deficits [Abdomen Tenderness] : non-tender [FreeTextEntry1] : faint erythematous macules over arms. no cellulitis

## 2020-01-30 NOTE — HISTORY OF PRESENT ILLNESS
[FreeTextEntry1] : 54M was admitted 1/24-1/26 initially for concern for orbital cellulitis but images were negative. Treated as bulbar process (conjunctivitis) with pre-septal cellulitis. \par He has a history of hives to penicillin. He tolerated Ceftriaxone in the hospital so I transitioned him to Vantin and Bactrim to complete a course but Tuesday developed a rash to his whole body, arms, trunk, legs. Hives, itchy. Not relieved with Benadryl. Stopped the Vantin but took Bactrim still. \par Saw Ophthalmology Monday and today and is doing much better.  [Monogamous] : is not monogamous [Women] : with women

## 2020-01-30 NOTE — REVIEW OF SYSTEMS
[Fever] : no fever [Chills] : no chills [Body Aches] : no body aches [Discharge From Eyes] : no purulent discharge from the eyes [Eye Pain] : eye pain [Nasal Discharge] : no nasal discharge [Sore Throat] : no sore throat [Chest Pain] : no chest pain [Palpitations] : no palpitations [Shortness Of Breath] : no shortness of breath [Abdominal Pain] : no abdominal pain [Cough] : no cough [Dysuria] : no dysuria [Skin Lesions] : skin lesion [Vomiting] : no vomiting [Itching] : itching [FreeTextEntry3] : eye is much better

## 2020-02-06 ENCOUNTER — APPOINTMENT (OUTPATIENT)
Dept: OPHTHALMOLOGY | Facility: CLINIC | Age: 55
End: 2020-02-06

## 2020-02-20 ENCOUNTER — APPOINTMENT (OUTPATIENT)
Dept: OPHTHALMOLOGY | Facility: CLINIC | Age: 55
End: 2020-02-20

## 2020-03-17 ENCOUNTER — APPOINTMENT (OUTPATIENT)
Dept: OPHTHALMOLOGY | Facility: CLINIC | Age: 55
End: 2020-03-17

## 2020-04-09 ENCOUNTER — APPOINTMENT (OUTPATIENT)
Dept: OPHTHALMOLOGY | Facility: CLINIC | Age: 55
End: 2020-04-09

## 2020-09-08 NOTE — H&P ADULT - NSHPPHYSICALEXAM_GEN_ALL_CORE
Fever, unspecified fever cause PHYSICAL EXAM:    Vital Signs Last 24 Hrs  T(C): 36.7 (24 Jan 2020 22:55), Max: 36.8 (24 Jan 2020 17:00)  T(F): 98.1 (24 Jan 2020 22:55), Max: 98.2 (24 Jan 2020 17:00)  HR: 77 (24 Jan 2020 22:55) (75 - 77)  BP: 133/71 (24 Jan 2020 22:55) (131/78 - 140/82)  BP(mean): --  RR: 17 (24 Jan 2020 22:55) (16 - 18)  SpO2: 96% (24 Jan 2020 22:55) (96% - 97%)    GENERAL: NAD, well-groomed, well-developed  HEAD:  Atraumatic, Normocephalic  EYES: R eye chemosis and redness, lid not fully closing. mild tearing. +pain on EOM of R eye.  L eye without obvious findings.   ENMT: No tonsillar erythema, exudates, or enlargement; Moist mucous membranes, Good dentition, No lesions  NECK: Supple, No JVD, Normal thyroid  CHEST/LUNG: Clear to percussion bilaterally; No rales, rhonchi, wheezing, or rubs no resp distress or accessory muscle usage.   HEART: Regular rate and rhythm; No murmurs, rubs, or gallops, no sig LE edema.   ABDOMEN: Soft, Nontender, Nondistended; Bowel sounds present, no rebound/guarding  EXTREMITIES:  2+ Peripheral Pulses, No clubbing, cyanosis  LYMPH: No lymphadenopathy noted, no lymphangitis  SKIN: No rashes or lesions, no petechiae  NERVOUS SYSTEM:  Alert & Oriented X3, Good concentration; Motor Strength 5/5 B/L upper and lower extremities. no facial droop or dysarthria.

## 2020-11-06 ENCOUNTER — NON-APPOINTMENT (OUTPATIENT)
Age: 55
End: 2020-11-06

## 2022-12-07 ENCOUNTER — APPOINTMENT (OUTPATIENT)
Dept: ORTHOPEDIC SURGERY | Facility: CLINIC | Age: 57
End: 2022-12-07

## 2022-12-07 VITALS — BODY MASS INDEX: 26.88 KG/M2 | HEIGHT: 71 IN | WEIGHT: 192 LBS

## 2022-12-07 DIAGNOSIS — Z87.828 PERSONAL HISTORY OF OTHER (HEALED) PHYSICAL INJURY AND TRAUMA: ICD-10-CM

## 2022-12-07 DIAGNOSIS — Z78.9 OTHER SPECIFIED HEALTH STATUS: ICD-10-CM

## 2022-12-07 DIAGNOSIS — M76.32 ILIOTIBIAL BAND SYNDROME, LEFT LEG: ICD-10-CM

## 2022-12-07 PROCEDURE — 73564 X-RAY EXAM KNEE 4 OR MORE: CPT | Mod: LT

## 2022-12-07 PROCEDURE — 99203 OFFICE O/P NEW LOW 30 MIN: CPT | Mod: 25

## 2022-12-07 NOTE — IMAGING
[Left] : left knee [All Views] : anteroposterior, lateral, skyline, and anteroposterior standing [There are no fractures, subluxations or dislocations. No significant abnormalities are seen] : There are no fractures, subluxations or dislocations. No significant abnormalities are seen [Mild tricompartmental OA medial narrowing] : Mild tricompartmental OA medial narrowing

## 2022-12-07 NOTE — DISCUSSION/SUMMARY
[de-identified] : 56m with left knee history of ACL tear, mild djd on x-ray today and ITB syndrome\par 1) start physical therapy\par 2) heat, cryotherapy, rest and activity modification\par 3) rtc prn \par \par Entered by Lyssa Pool acting as scribe.\par

## 2022-12-07 NOTE — HISTORY OF PRESENT ILLNESS
[Lower back] : lower back [7] : 7 [Dull/Aching] : dull/aching [Throbbing] : throbbing [Household chores] : household chores [Leisure] : leisure [Sleep] : sleep [Meds] : meds [Ice] : ice [Standing] : standing [Walking] : walking [Exercising] : exercising [Stairs] : stairs [Lying in bed] : lying in bed [Full time] : Work status: full time [de-identified] : 12/7/2022: 56yr old complaining of left knee pain. He states he may have hurt himself playing basketball a month ago. He is experiencing pain in his left knee, left calf and lower back. Patient states he tore his ACL in 1985, treated with PT and without surgery. Has noticed some recent instability / buckling episodes [] : no [FreeTextEntry1] : left knee [FreeTextEntry9] : advil, tiger balm [de-identified] :

## 2022-12-07 NOTE — PHYSICAL EXAM
[Left] : left knee [NL (0)] : extension 0 degrees [Negative] : negative Arianne's [] : no extensor lag [TWNoteComboBox7] : flexion 130 degrees
